# Patient Record
Sex: MALE | Race: WHITE | NOT HISPANIC OR LATINO | Employment: OTHER | URBAN - METROPOLITAN AREA
[De-identification: names, ages, dates, MRNs, and addresses within clinical notes are randomized per-mention and may not be internally consistent; named-entity substitution may affect disease eponyms.]

---

## 2017-01-29 ENCOUNTER — HOSPITAL ENCOUNTER (EMERGENCY)
Facility: OTHER | Age: 77
Discharge: HOME | End: 2017-01-29
Attending: EMERGENCY MEDICINE
Payer: MEDICARE

## 2017-01-29 VITALS
RESPIRATION RATE: 18 BRPM | SYSTOLIC BLOOD PRESSURE: 131 MMHG | TEMPERATURE: 98 F | DIASTOLIC BLOOD PRESSURE: 56 MMHG | OXYGEN SATURATION: 97 % | WEIGHT: 148 LBS | BODY MASS INDEX: 22.5 KG/M2 | HEART RATE: 61 BPM

## 2017-01-29 DIAGNOSIS — I10 ASYMPTOMATIC HYPERTENSION: Primary | ICD-10-CM

## 2017-01-29 DIAGNOSIS — I15.9 SECONDARY HYPERTENSION: ICD-10-CM

## 2017-01-29 LAB
POC B-TYPE NATRIURETIC PEPTIDE: 307 PG/ML (ref 0–100)
POC CARDIAC TROPONIN I: 0 NG/ML
SAMPLE: NORMAL

## 2017-01-29 PROCEDURE — 83880 ASSAY OF NATRIURETIC PEPTIDE: CPT

## 2017-01-29 PROCEDURE — 84484 ASSAY OF TROPONIN QUANT: CPT

## 2017-01-29 PROCEDURE — 80053 COMPREHEN METABOLIC PANEL: CPT

## 2017-01-29 PROCEDURE — 99284 EMERGENCY DEPT VISIT MOD MDM: CPT | Mod: 25

## 2017-01-29 NOTE — DISCHARGE INSTRUCTIONS
There was no evidence of any end organ damage from your high blood pressure during your workup in the emergency department today.  However if your blood pressure is actually as high as some of your home readings, it is possible that he will need to work with your doctors, particularly Dr. Keller in changing your dosing, possibly even increasing your clonidine dosing.  Call Dr. Keller first thing Monday morning to discuss her ED visit and possible changes.        Step-by-Step  Checking Your Blood Pressure    © 9176-3308 [employer]. [address] [place] [address], [place] [address]. All rights reserved. This information is not intended as a substitute for professional medical care. Always follow your healthcare professional's instructions.          Taking Your Blood Pressure  Blood pressure is the force of blood against the artery wall as it moves from the heart through the blood vessels. You can take your own blood pressure reading using a digital monitor. Take readings as often as your healthcare provider instructs. Take each reading at the same time of day.  Step 1. Relax    · Take your blood pressure at the same time every day, such as in the morning or evening, or at the time your healthcare provider recommends.  · Wait at least a half-hour after smoking, eating, drinking caffeinated beverages, or exercising.  · Sit comfortably at a table with both feet on the floor. Do not cross your legs or feet. Place the monitor near you.  · Rest for a few minutes before you begin.  Step 2. Wrap the cuff    · Place your arm on the table, palm up. Your arm should be at the level of your heart. Wrap the cuff around your upper arm, just above your elbow. Its best done on bare skin, not over clothing. Most cuffs will indicate where the brachial artery (the blood vessel in the middle of the arm at the inner side of the elbow) should line up with the cuff. Look in your monitor's instruction booklet for an illustration. You  can also bring your cuff to your healthcare provider and have them show you how to correctly place the cuff.  · Make sure your cuff fits. If it doesnt wrap around your upper arm, order a larger cuff.  Step 3. Inflate the cuff    · Pump the cuff until the scale reads 160. If you have a self-inflating cuff, push the button that starts the pump.  · The cuff will tighten, then loosen.  · The numbers will change. When they stop changing, your blood pressure reading will appear.  · Take 2 or 3 readings one minute apart.  Step 4. Write down the results of each reading    · Write down your blood pressure numbers for each reading. Note the date and time. Keep your results in one place, such as a notebook. Even if your monitor has a built-in memory, keep a hard copy of the readings.  · Remove the cuff from your arm. Turn off the machine.  · Share your blood pressure records with your healthcare providers at each visit.  © 3161-3983 [employer]. [address] [place] [address], [place] [address]. All rights reserved. This information is not intended as a substitute for professional medical care. Always follow your healthcare professional's instructions.          Discharge Instructions: Taking Your Blood Pressure  Blood pressure is the force of blood as it moves from the heart through the blood vessels. You can take your own blood pressure reading using a digital monitor. Take readings as often as your healthcare provider instructs. Take your readings each time in the same way, using the same arm. Here are guidelines for taking your blood pressure.  The American Heart Association (AHA) recommends purchasing a blood pressure monitor that is validated and approved by the Association for the Advancement of Medical Instrumentation, the [place] Hypertension Society, and the International Protocol for the Validation of Automated BP Measuring Devices. If the blood pressure monitor is for a senior adult, a pregnant woman, or a  child, make certain it is validated for use with such a population. For the most reliable readings, the AHA recommends an automatic, cuff-style, upper arm (bicep) monitor. The readings from finger and wrist monitors are not as reliable as the upper arm monitor.        Step 1. Relax    · Wait at least a half hour after smoking, eating, or exercising. Do not drink coffee, tea, soda, or other caffeinated beverages before checking your blood pressure.   · Sit comfortably at a table. Place the monitor near you.  · Rest for a few minutes before you begin.        Step 2. Wrap the cuff    · Place your arm on the table, palm up. Put your arm in a position that is level with your heart. Wrap the cuff around your upper arm, about an inch above your elbow. Its best to wrap the cuff on bare skin, not over clothing.  · Make sure your cuff fits. If it doesnt wrap around your upper arm, order a larger cuff. A cuff that is too large or too small can result in an inaccurate blood pressure reading.           Step 3. Inflate the cuff    · Pump the cuff until the scale reads 200. If you have a self-inflating cuff, push the button that starts the pump.  · The cuff will tighten, then loosen.  · The numbers will change. When they stop changing, your blood pressure reading will appear.  · If you get a reading that is too high or too low for you, relax for a few minutes. Then do the test again.    Step 4. Write down the results  · Write down your blood pressure numbers. Dony the date and time. Keep your results in one place, such as a notebook.  · Remove the cuff from your arm. Turn off the machine.  · Take the readings with you to your medical appointments.  · If you start a new blood pressure medicine, or change a blood pressure medicine dose, note the day you started the new drug or dosage on your blood pressure recording sheet. This will help your healthcare provider monitor the effect of medication changes.     © 7363-7767 The  [employer]. [address] [place] [address], [place] [address]. All rights reserved. This information is not intended as a substitute for professional medical care. Always follow your healthcare professional's instructions.

## 2017-01-29 NOTE — ED AVS SNAPSHOT
[facility] EMERGENCY DEPARTMENT  [address] [facility] [address]               Raymond Garcia   2017 12:16 PM   ED    Description:  Male : 1940   Department:  [facility] Emergency Department           Your Care was Coordinated By:     Provider Role From To    Gabriel Stanley MD Attending Provider 17 1223 --      Reason for Visit     Hypertension           Diagnoses this Visit        Comments    Asymptomatic hypertension    -  Primary     Secondary hypertension           ED Disposition     ED Disposition Condition Comment    Discharge             To Do List           Follow-up Information     Schedule an appointment as soon as possible for a visit with Jesse Hogan MD.    Specialties:  Internal Medicine, Oncology, Hematology and Oncology    Why:  to follow up ED visit    Contact information:    [address]  [address]  [facility] [address]  [phone]          Call Sergio Keller MD.    Specialty:  Cardiology    Why:  to follow up ED visit and discuss increasing clonidine dosing.    Contact information:    [address]  [address]  hospitals CARDIOLOGY  [place] [id number]  [phone]          Follow up with [facility] Emergency Department.    Specialty:  Emergency Medicine    Why:  As needed, If symptoms worsen or you develop chest pain, shortness of breath or confusion.    Contact information:    [address] [facility] [address]  [phone]      [facility] On [facility]     [facility] On Call Nurse Care Line - 24/7 Assistance  Registered nurses in the [facility] On Call Center provide clinical advisement, health education, appointment booking, and other advisory services.  Call for this free service at [phone].             Medications           Message regarding Medications     Verify the changes and/or additions to your medication regime listed below are the same as discussed with your clinician today.  If any of these changes or additions are incorrect,  please notify your healthcare provider.             Verify that the below list of medications is an accurate representation of the medications you are currently taking.  If none reported, the list may be blank. If incorrect, please contact your healthcare provider. Carry this list with you in case of emergency.           Current Medications     acetaminophen-codeine 300-30mg (TYLENOL #3) 300-30 mg Tab Take by mouth every 6 (six) hours as needed.    aspirin 81 MG Chew Take 81 mg by mouth once daily.    atorvastatin (LIPITOR) 40 MG tablet Take 1 tablet (40 mg total) by mouth once daily.    clobetasol (CLOBEX) 0.05 % shampoo     clobetasol (TEMOVATE) 0.05 % cream Apply topically continuous prn.    desonide (DESOWEN) 0.05 % cream Apply topically 2 (two) times daily.    diltiazem (CARDIZEM) 30 MG tablet Take 1 tablet (30 mg total) by mouth every 6 (six) hours.    fenofibrate 160 MG Tab Take 1 tablet (160 mg total) by mouth once daily.    fluocinonide-emollient 0.05 % Crea Apply topically 2 (two) times daily.    fluticasone (VERAMYST) 27.5 mcg/actuation nasal spray 1 spray by Nasal route once daily.    isosorbide mononitrate (IMDUR) 30 MG 24 hr tablet Take 1 tablet (30 mg total) by mouth once daily.    lactulose (CHRONULAC) 10 gram/15 mL solution     lisinopril (PRINIVIL,ZESTRIL) 20 MG tablet TAKE ONE TABLET BY MOUTH EVERY DAY    metformin (GLUCOPHAGE) 500 MG tablet Take 1 tablet (500 mg total) by mouth once daily.    olanzapine (ZYPREXA) 15 MG Tab Take 5 mg by mouth nightly.           Clinical Reference Information           Your Vitals Were     BP Pulse Temp Resp Weight SpO2    131/56 61 98.1 °F (36.7 °C) 18 67.1 kg (148 lb) 97%    BMI                22.5 kg/m2          Allergies as of 1/29/2017        Reactions    Antihistamines - Alkylamine Shortness Of Breath    Gabapentin Shortness Of Breath    Ultram [Tramadol] Shortness Of Breath, Palpitations, Other (See Comments)    Irritability    Anusol-hc [Hydrocortisone]  Itching    Hyoscyamine Other (See Comments)    Depressed feeling    Butalbital-acetaminophen Palpitations    Zocor [Simvastatin] Nausea Only      Immunizations Administered on Date of Encounter - 1/29/2017     None      ED Micro, Lab, POCT     Start Ordered       Status Ordering Provider    01/29/17 1318 01/29/17 1318  POCT B-type natriuretic peptide (BNP)  Once      Final result     01/29/17 1303 01/29/17 1303  Troponin ISTAT  Once      Final result     01/29/17 1234 01/29/17 1233  POCT Troponin  Once      Completed     01/29/17 1234 01/29/17 1233  POCT B-type natriuretic peptide (BNP)  Once      Completed     01/29/17 1234 01/29/17 1233  POCT CMP  Once      Completed       ED Imaging Orders     None        Discharge Instructions       There was no evidence of any end organ damage from your high blood pressure during your workup in the emergency department today.  However if your blood pressure is actually as high as some of your home readings, it is possible that he will need to work with your doctors, particularly Dr. Keller in changing your dosing, possibly even increasing your clonidine dosing.  Call Dr. Keller first thing Monday morning to discuss her ED visit and possible changes.        Step-by-Step  Checking Your Blood Pressure    © 2732-4121 [employer]. [address] [place] [address], [place] [address]. All rights reserved. This information is not intended as a substitute for professional medical care. Always follow your healthcare professional's instructions.          Taking Your Blood Pressure  Blood pressure is the force of blood against the artery wall as it moves from the heart through the blood vessels. You can take your own blood pressure reading using a digital monitor. Take readings as often as your healthcare provider instructs. Take each reading at the same time of day.  Step 1. Relax    · Take your blood pressure at the same time every day, such as in the morning or evening, or at the time  your healthcare provider recommends.  · Wait at least a half-hour after smoking, eating, drinking caffeinated beverages, or exercising.  · Sit comfortably at a table with both feet on the floor. Do not cross your legs or feet. Place the monitor near you.  · Rest for a few minutes before you begin.  Step 2. Wrap the cuff    · Place your arm on the table, palm up. Your arm should be at the level of your heart. Wrap the cuff around your upper arm, just above your elbow. Its best done on bare skin, not over clothing. Most cuffs will indicate where the brachial artery (the blood vessel in the middle of the arm at the inner side of the elbow) should line up with the cuff. Look in your monitor's instruction booklet for an illustration. You can also bring your cuff to your healthcare provider and have them show you how to correctly place the cuff.  · Make sure your cuff fits. If it doesnt wrap around your upper arm, order a larger cuff.  Step 3. Inflate the cuff    · Pump the cuff until the scale reads 160. If you have a self-inflating cuff, push the button that starts the pump.  · The cuff will tighten, then loosen.  · The numbers will change. When they stop changing, your blood pressure reading will appear.  · Take 2 or 3 readings one minute apart.  Step 4. Write down the results of each reading    · Write down your blood pressure numbers for each reading. Note the date and time. Keep your results in one place, such as a notebook. Even if your monitor has a built-in memory, keep a hard copy of the readings.  · Remove the cuff from your arm. Turn off the machine.  · Share your blood pressure records with your healthcare providers at each visit.  © 0053-2942 [employer]. [address] [place] [address], [place] [address]. All rights reserved. This information is not intended as a substitute for professional medical care. Always follow your healthcare professional's instructions.          Discharge Instructions:  Taking Your Blood Pressure  Blood pressure is the force of blood as it moves from the heart through the blood vessels. You can take your own blood pressure reading using a digital monitor. Take readings as often as your healthcare provider instructs. Take your readings each time in the same way, using the same arm. Here are guidelines for taking your blood pressure.  The American Heart Association (AHA) recommends purchasing a blood pressure monitor that is validated and approved by the Association for the Advancement of Medical Instrumentation, the [place] Hypertension Society, and the International Protocol for the Validation of Automated BP Measuring Devices. If the blood pressure monitor is for a senior adult, a pregnant woman, or a child, make certain it is validated for use with such a population. For the most reliable readings, the AHA recommends an automatic, cuff-style, upper arm (bicep) monitor. The readings from finger and wrist monitors are not as reliable as the upper arm monitor.        Step 1. Relax    · Wait at least a half hour after smoking, eating, or exercising. Do not drink coffee, tea, soda, or other caffeinated beverages before checking your blood pressure.   · Sit comfortably at a table. Place the monitor near you.  · Rest for a few minutes before you begin.        Step 2. Wrap the cuff    · Place your arm on the table, palm up. Put your arm in a position that is level with your heart. Wrap the cuff around your upper arm, about an inch above your elbow. Its best to wrap the cuff on bare skin, not over clothing.  · Make sure your cuff fits. If it doesnt wrap around your upper arm, order a larger cuff. A cuff that is too large or too small can result in an inaccurate blood pressure reading.           Step 3. Inflate the cuff    · Pump the cuff until the scale reads 200. If you have a self-inflating cuff, push the button that starts the pump.  · The cuff will tighten, then loosen.  · The  numbers will change. When they stop changing, your blood pressure reading will appear.  · If you get a reading that is too high or too low for you, relax for a few minutes. Then do the test again.    Step 4. Write down the results  · Write down your blood pressure numbers. Dony the date and time. Keep your results in one place, such as a notebook.  · Remove the cuff from your arm. Turn off the machine.  · Take the readings with you to your medical appointments.  · If you start a new blood pressure medicine, or change a blood pressure medicine dose, note the day you started the new drug or dosage on your blood pressure recording sheet. This will help your healthcare provider monitor the effect of medication changes.     © 9252-6108 [employer]. [address] [place] [address], [place] [address]. All rights reserved. This information is not intended as a substitute for professional medical care. Always follow your healthcare professional's instructions.          Your Scheduled Appointments     Apr 05, 2017  9:20 AM CDT   Established Patient - Geriatric with Jesse Hogan MD   [facility] [place] ([facility])    [address] [place] [id number]   [phone]              Smoking Cessation     If you would like to quit smoking:   You may be eligible for free services if you are a [place] resident and started smoking cigarettes before September 1, 1988.  Call the Smoking Cessation Trust (SCT) toll free at (070) 896-4607 or (618) 105-0491.   Call 5-002-QUIT-NOW if you do not meet the above criteria.             [facility] Emergency Department complies with applicable Federal civil rights laws and does not discriminate on the basis of race, color, national origin, age, disability, or sex.        Language Assistance Services     ATTENTION: Language assistance services are available, free of charge. Please call [phone].      ATENCIÓN: Si christopherla brenna, tiene a brooks disposición servicios  gratuitos de asistencia lingüística. Isai callahan [phone].     MILIND Ý: N?u b?n nói Ti?ng Vi?t, có các d?ch v? h? tr? ngôn ng? mi?n phí suzanh cho b?n. G?i s? [phone].

## 2017-01-29 NOTE — ED PROVIDER NOTES
Encounter Date: 1/29/2017       History     Chief Complaint   Patient presents with    Hypertension     states his pressure was high while at home, pressure on 131/56     Review of patient's allergies indicates:   Allergen Reactions    Antihistamines - alkylamine Shortness Of Breath    Gabapentin Shortness Of Breath    Ultram [tramadol] Shortness Of Breath, Palpitations and Other (See Comments)     Irritability    Anusol-hc [hydrocortisone] Itching    Hyoscyamine Other (See Comments)     Depressed feeling    Butalbital-acetaminophen Palpitations    Zocor [simvastatin] Nausea Only     HPI Comments: Patient presenting with asymptomatic elevation of his blood pressure.  He reports that at home today prior to taking his morning medication his systolic blood pressure was 205.  He took his morning blood pressure and it breakfasts and recheck it was then 225.  He took 1 additional dose of clonidine and presented to the ED, where his systolic was found to be in the 130s.  At no point throughout this process to the patient have any chest pain, shortness of breath, confusion or visual changes.    Past medical history significant for a recent presentation for a CVA versus TIA versus hypertensive emergency in which she presented 3 months ago with neurological deficits and malignant hypertension, then received TPA after his blood pressure was controlled.  He reports being followed by Dr. Keller for cardiology and was recently started on clonidine several weeks ago.  He reports that his initial dose was once daily, but now he is on twice daily clonidine.  He denies missing any doses of his clonidine or any other antihypertensive.    The history is provided by the patient and medical records.     Past Medical History   Diagnosis Date    Anxiety     Arthritis     Bipolar depression     Cataract     Depression     Hypertension     Peripheral arterial disease     Prediabetes      No past medical history pertinent  negatives.  Past Surgical History   Procedure Laterality Date    Vascular surgery Left      Left leg    Back surgery       x 2    Shoulder surgery Left      Family History   Problem Relation Age of Onset    Heart failure Father      Social History   Substance Use Topics    Smoking status: Former Smoker     Packs/day: 2.00     Years: 50.00     Types: Cigarettes     Quit date: 1/25/2010    Smokeless tobacco: Never Used    Alcohol use No     Review of Systems   Constitutional: Negative for fever.   HENT: Negative for facial swelling.    Eyes: Negative for redness.   Respiratory: Negative for shortness of breath.    Cardiovascular: Negative for chest pain.   Gastrointestinal: Negative for vomiting.   Musculoskeletal: Negative for gait problem.   Skin: Negative for pallor.   Neurological: Negative for facial asymmetry.   Psychiatric/Behavioral: Negative for confusion.   All other systems reviewed and are negative.      Physical Exam   Initial Vitals   BP Pulse Resp Temp SpO2   01/29/17 1204 01/29/17 1204 01/29/17 1204 01/29/17 1204 01/29/17 1204   131/56 61 18 98.1 °F (36.7 °C) 97 %     Physical Exam    Nursing note and vitals reviewed.  Constitutional: He appears well-developed and well-nourished. He is not diaphoretic.   Well-appearing elderly white male with a systolic blood pressure in the 130s.   HENT:   Head: Normocephalic and atraumatic.   Eyes: EOM are normal.   Neck: Normal range of motion. Neck supple.   Cardiovascular: Normal rate and regular rhythm.   Pulmonary/Chest: Breath sounds normal. No respiratory distress. He has no rales.   Abdominal: Soft. He exhibits no distension. Mass: suprapubic, mid line circular mass. There is no rebound.   Musculoskeletal: Normal range of motion. He exhibits no edema.   Neurological: He is alert and oriented to person, place, and time.   Skin: Skin is warm and dry.   Psychiatric: He has a normal mood and affect.         ED Course   Procedures  Labs Reviewed - No data  to display          Medical Decision Making:   Initial Assessment:   Patient presenting with asymptomatic hypertension, resolved PTA  Differential Diagnosis:   Rebound hypertension secondary to clonidine dosing, malfunctioning home pressure machine, labile hypertension, resolved hypertensive emergency.  ED Management:  Although the patient's blood pressure is normal in the ED and he has had no symptoms of chest pain, shortness of breath or confusion.  His past medical history and were reported systolic blood pressure over 220 warrants further workup for end organ damage.  We'll obtain EKG, CMP, troponin, BNP to evaluate for any silent and organ damage.    The patient's disposition is pending remaining results of the ED workup.  Will continue to closely monitor the patient throughout their ED stay.    Gabriel Stanley MD,   Emergency Medicine  01/29/2017 1:00 PM      Update:    BNP resulted at 307, troponin is undetectable, creatinine is 0.8.  EKG findings as below.    No evidence of end organ damage.    In my review of standard clonidine dosing, it does not appear there is any indication for more than twice daily dosing, therefore rebound hypertension from clonidine is an unlikely etiology.  He may simply need higher dosing of clonidine and his other antihypertensive to appropriately control his blood pressure.  He will be discharged with instructions to follow up closely with his cardiologist and given strict return precautions.    Gabriel Stanley MD,   Emergency Medicine  01/29/2017 2:03 PM    (This note was written with voice recognition software.  Please excuse any grammatical errors.)   (This note was written with voice recognition software.  Please excuse any grammatical errors.)                     ED Course   Comment By Time   EKG shows no ST elevation, sinus bradycardia. Gabriel Stanley MD 01/29 7301     Clinical Impression:   The primary encounter diagnosis was Asymptomatic hypertension. A diagnosis of  Secondary hypertension was also pertinent to this visit.          Gabriel Stanley MD  01/29/17 6002

## 2017-02-23 ENCOUNTER — HOSPITAL ENCOUNTER (INPATIENT)
Facility: HOSPITAL | Age: 77
LOS: 1 days | Discharge: HOME | DRG: 305 | End: 2017-02-25
Attending: EMERGENCY MEDICINE | Admitting: INTERNAL MEDICINE
Payer: MEDICARE

## 2017-02-23 DIAGNOSIS — R00.1 SYMPTOMATIC BRADYCARDIA: ICD-10-CM

## 2017-02-23 DIAGNOSIS — R55 NEAR SYNCOPE: ICD-10-CM

## 2017-02-23 DIAGNOSIS — E86.0 DEHYDRATION: ICD-10-CM

## 2017-02-23 DIAGNOSIS — I16.0 HYPERTENSIVE URGENCY: ICD-10-CM

## 2017-02-23 DIAGNOSIS — I95.1 ORTHOSTATIC HYPOTENSION: ICD-10-CM

## 2017-02-23 DIAGNOSIS — R42 DIZZINESS: Primary | ICD-10-CM

## 2017-02-23 LAB
ALBUMIN SERPL BCP-MCNC: 4 G/DL
ALP SERPL-CCNC: 50 U/L
ALT SERPL W/O P-5'-P-CCNC: 10 U/L
ANION GAP SERPL CALC-SCNC: 10 MMOL/L
APTT BLDCRRT: 28.2 SEC
AST SERPL-CCNC: 16 U/L
BACTERIA #/AREA URNS HPF: NORMAL /HPF
BASOPHILS # BLD AUTO: 0.06 K/UL
BASOPHILS NFR BLD: 0.8 %
BILIRUB SERPL-MCNC: 0.7 MG/DL
BILIRUB UR QL STRIP: NEGATIVE
BNP SERPL-MCNC: 140 PG/ML
BUN SERPL-MCNC: 11 MG/DL
CALCIUM SERPL-MCNC: 9.8 MG/DL
CHLORIDE SERPL-SCNC: 96 MMOL/L
CLARITY UR: CLEAR
CO2 SERPL-SCNC: 30 MMOL/L
COLOR UR: ABNORMAL
CREAT SERPL-MCNC: 1 MG/DL
DIFFERENTIAL METHOD: ABNORMAL
EOSINOPHIL # BLD AUTO: 0.2 K/UL
EOSINOPHIL NFR BLD: 3.2 %
ERYTHROCYTE [DISTWIDTH] IN BLOOD BY AUTOMATED COUNT: 14 %
EST. GFR  (AFRICAN AMERICAN): >60 ML/MIN/1.73 M^2
EST. GFR  (NON AFRICAN AMERICAN): >60 ML/MIN/1.73 M^2
FLUAV AG SPEC QL IA: NEGATIVE
FLUBV AG SPEC QL IA: NEGATIVE
GIANT PLATELETS BLD QL SMEAR: PRESENT
GLUCOSE SERPL-MCNC: 106 MG/DL
GLUCOSE UR QL STRIP: NEGATIVE
HCT VFR BLD AUTO: 42.7 %
HGB BLD-MCNC: 13.7 G/DL
HGB UR QL STRIP: NEGATIVE
HYALINE CASTS #/AREA URNS LPF: 0 /LPF
INR PPP: 1
KETONES UR QL STRIP: NEGATIVE
LEUKOCYTE ESTERASE UR QL STRIP: NEGATIVE
LYMPHOCYTES # BLD AUTO: 1.2 K/UL
LYMPHOCYTES NFR BLD: 15.9 %
MAGNESIUM SERPL-MCNC: 1.9 MG/DL
MCH RBC QN AUTO: 29.4 PG
MCHC RBC AUTO-ENTMCNC: 32.1 %
MCV RBC AUTO: 92 FL
MICROSCOPIC COMMENT: NORMAL
MONOCYTES # BLD AUTO: 0.7 K/UL
MONOCYTES NFR BLD: 9.6 %
NEUTROPHILS # BLD AUTO: 5.4 K/UL
NEUTROPHILS NFR BLD: 70.6 %
NITRITE UR QL STRIP: NEGATIVE
PH UR STRIP: 7 [PH] (ref 5–8)
PLATELET # BLD AUTO: 225 K/UL
PLATELET BLD QL SMEAR: ABNORMAL
PMV BLD AUTO: 11.7 FL
POCT GLUCOSE: 104 MG/DL (ref 70–110)
POCT GLUCOSE: 78 MG/DL (ref 70–110)
POTASSIUM SERPL-SCNC: 4.3 MMOL/L
PROT SERPL-MCNC: 7.6 G/DL
PROT UR QL STRIP: ABNORMAL
PROTHROMBIN TIME: 10.7 SEC
RBC # BLD AUTO: 4.66 M/UL
RBC #/AREA URNS HPF: 0 /HPF (ref 0–4)
SODIUM SERPL-SCNC: 136 MMOL/L
SP GR UR STRIP: 1.01 (ref 1–1.03)
SPECIMEN SOURCE: NORMAL
TROPONIN I SERPL DL<=0.01 NG/ML-MCNC: 0.01 NG/ML
TROPONIN I SERPL DL<=0.01 NG/ML-MCNC: 0.04 NG/ML
TROPONIN I SERPL DL<=0.01 NG/ML-MCNC: <0.006 NG/ML
TSH SERPL DL<=0.005 MIU/L-ACNC: 1.24 UIU/ML
URN SPEC COLLECT METH UR: ABNORMAL
UROBILINOGEN UR STRIP-ACNC: NEGATIVE EU/DL
WBC # BLD AUTO: 7.59 K/UL
WBC #/AREA URNS HPF: 2 /HPF (ref 0–5)

## 2017-02-23 PROCEDURE — 96375 TX/PRO/DX INJ NEW DRUG ADDON: CPT

## 2017-02-23 PROCEDURE — 25000003 PHARM REV CODE 250: Performed by: EMERGENCY MEDICINE

## 2017-02-23 PROCEDURE — 63600175 PHARM REV CODE 636 W HCPCS: Performed by: EMERGENCY MEDICINE

## 2017-02-23 PROCEDURE — C9113 INJ PANTOPRAZOLE SODIUM, VIA: HCPCS | Performed by: EMERGENCY MEDICINE

## 2017-02-23 PROCEDURE — 36415 COLL VENOUS BLD VENIPUNCTURE: CPT

## 2017-02-23 PROCEDURE — 85610 PROTHROMBIN TIME: CPT

## 2017-02-23 PROCEDURE — 96374 THER/PROPH/DIAG INJ IV PUSH: CPT

## 2017-02-23 PROCEDURE — G0378 HOSPITAL OBSERVATION PER HR: HCPCS

## 2017-02-23 PROCEDURE — 99285 EMERGENCY DEPT VISIT HI MDM: CPT | Mod: 25

## 2017-02-23 PROCEDURE — 80053 COMPREHEN METABOLIC PANEL: CPT

## 2017-02-23 PROCEDURE — 96361 HYDRATE IV INFUSION ADD-ON: CPT

## 2017-02-23 PROCEDURE — 84443 ASSAY THYROID STIM HORMONE: CPT

## 2017-02-23 PROCEDURE — 93005 ELECTROCARDIOGRAM TRACING: CPT

## 2017-02-23 PROCEDURE — 81000 URINALYSIS NONAUTO W/SCOPE: CPT

## 2017-02-23 PROCEDURE — 83735 ASSAY OF MAGNESIUM: CPT

## 2017-02-23 PROCEDURE — 83880 ASSAY OF NATRIURETIC PEPTIDE: CPT

## 2017-02-23 PROCEDURE — 96376 TX/PRO/DX INJ SAME DRUG ADON: CPT

## 2017-02-23 PROCEDURE — 85730 THROMBOPLASTIN TIME PARTIAL: CPT

## 2017-02-23 PROCEDURE — 84484 ASSAY OF TROPONIN QUANT: CPT | Mod: 91

## 2017-02-23 PROCEDURE — 82962 GLUCOSE BLOOD TEST: CPT

## 2017-02-23 PROCEDURE — 63700000 PHARM REV CODE 250 ALT 637 W/O HCPCS: Performed by: EMERGENCY MEDICINE

## 2017-02-23 PROCEDURE — 87400 INFLUENZA A/B EACH AG IA: CPT

## 2017-02-23 PROCEDURE — 85025 COMPLETE CBC W/AUTO DIFF WBC: CPT

## 2017-02-23 RX ORDER — BISACODYL 10 MG
10 SUPPOSITORY, RECTAL RECTAL DAILY PRN
Status: DISCONTINUED | OUTPATIENT
Start: 2017-02-23 | End: 2017-02-25 | Stop reason: HOSPADM

## 2017-02-23 RX ORDER — NAPROXEN SODIUM 220 MG/1
81 TABLET, FILM COATED ORAL DAILY
Status: DISCONTINUED | OUTPATIENT
Start: 2017-02-23 | End: 2017-02-25 | Stop reason: HOSPADM

## 2017-02-23 RX ORDER — ATORVASTATIN CALCIUM 40 MG/1
40 TABLET, FILM COATED ORAL DAILY
Status: DISCONTINUED | OUTPATIENT
Start: 2017-02-23 | End: 2017-02-25 | Stop reason: HOSPADM

## 2017-02-23 RX ORDER — FENOFIBRATE 160 MG/1
160 TABLET ORAL DAILY
Status: DISCONTINUED | OUTPATIENT
Start: 2017-02-23 | End: 2017-02-25 | Stop reason: HOSPADM

## 2017-02-23 RX ORDER — CLONIDINE HYDROCHLORIDE 0.1 MG/1
0.1 TABLET ORAL 2 TIMES DAILY
Status: ON HOLD | COMMUNITY
End: 2017-03-16 | Stop reason: HOSPADM

## 2017-02-23 RX ORDER — HYDRALAZINE HYDROCHLORIDE 20 MG/ML
10 INJECTION INTRAMUSCULAR; INTRAVENOUS
Status: COMPLETED | OUTPATIENT
Start: 2017-02-23 | End: 2017-02-23

## 2017-02-23 RX ORDER — SODIUM CHLORIDE 9 MG/ML
1000 INJECTION, SOLUTION INTRAVENOUS ONCE
Status: DISCONTINUED | OUTPATIENT
Start: 2017-02-23 | End: 2017-02-23

## 2017-02-23 RX ORDER — MECLIZINE HYDROCHLORIDE 25 MG/1
25 TABLET ORAL
Status: COMPLETED | OUTPATIENT
Start: 2017-02-23 | End: 2017-02-23

## 2017-02-23 RX ORDER — FLUTICASONE PROPIONATE 50 MCG
1 SPRAY, SUSPENSION (ML) NASAL DAILY
Status: DISCONTINUED | OUTPATIENT
Start: 2017-02-23 | End: 2017-02-25 | Stop reason: HOSPADM

## 2017-02-23 RX ORDER — ONDANSETRON 2 MG/ML
4 INJECTION INTRAMUSCULAR; INTRAVENOUS EVERY 12 HOURS PRN
Status: DISCONTINUED | OUTPATIENT
Start: 2017-02-23 | End: 2017-02-25 | Stop reason: HOSPADM

## 2017-02-23 RX ORDER — PANTOPRAZOLE SODIUM 40 MG/10ML
40 INJECTION, POWDER, LYOPHILIZED, FOR SOLUTION INTRAVENOUS DAILY
Status: DISCONTINUED | OUTPATIENT
Start: 2017-02-23 | End: 2017-02-25 | Stop reason: HOSPADM

## 2017-02-23 RX ORDER — CLONIDINE HYDROCHLORIDE 0.1 MG/1
0.1 TABLET ORAL 2 TIMES DAILY
Status: DISCONTINUED | OUTPATIENT
Start: 2017-02-23 | End: 2017-02-24

## 2017-02-23 RX ORDER — INSULIN ASPART 100 [IU]/ML
0-5 INJECTION, SOLUTION INTRAVENOUS; SUBCUTANEOUS EVERY 6 HOURS PRN
Status: DISCONTINUED | OUTPATIENT
Start: 2017-02-23 | End: 2017-02-25 | Stop reason: HOSPADM

## 2017-02-23 RX ORDER — ISOSORBIDE MONONITRATE 30 MG/1
30 TABLET, EXTENDED RELEASE ORAL DAILY
Status: DISCONTINUED | OUTPATIENT
Start: 2017-02-23 | End: 2017-02-25 | Stop reason: HOSPADM

## 2017-02-23 RX ORDER — ACETAMINOPHEN AND CODEINE PHOSPHATE 300; 30 MG/1; MG/1
1 TABLET ORAL EVERY 6 HOURS PRN
Status: DISCONTINUED | OUTPATIENT
Start: 2017-02-23 | End: 2017-02-25 | Stop reason: HOSPADM

## 2017-02-23 RX ORDER — OLANZAPINE 2.5 MG/1
5 TABLET ORAL NIGHTLY
Status: DISCONTINUED | OUTPATIENT
Start: 2017-02-23 | End: 2017-02-25 | Stop reason: HOSPADM

## 2017-02-23 RX ORDER — GLUCAGON 1 MG
1 KIT INJECTION
Status: DISCONTINUED | OUTPATIENT
Start: 2017-02-23 | End: 2017-02-25 | Stop reason: HOSPADM

## 2017-02-23 RX ORDER — LISINOPRIL 20 MG/1
20 TABLET ORAL DAILY
Status: DISCONTINUED | OUTPATIENT
Start: 2017-02-23 | End: 2017-02-25 | Stop reason: HOSPADM

## 2017-02-23 RX ADMIN — PANTOPRAZOLE SODIUM 40 MG: 40 INJECTION, POWDER, FOR SOLUTION INTRAVENOUS at 02:02

## 2017-02-23 RX ADMIN — SODIUM CHLORIDE 1000 ML: 0.9 INJECTION, SOLUTION INTRAVENOUS at 07:02

## 2017-02-23 RX ADMIN — OLANZAPINE 5 MG: 2.5 TABLET, FILM COATED ORAL at 10:02

## 2017-02-23 RX ADMIN — ATORVASTATIN CALCIUM 40 MG: 40 TABLET, FILM COATED ORAL at 02:02

## 2017-02-23 RX ADMIN — ASPIRIN 81 MG CHEWABLE TABLET 81 MG: 81 TABLET CHEWABLE at 02:02

## 2017-02-23 RX ADMIN — HYDRALAZINE HYDROCHLORIDE 10 MG: 20 INJECTION INTRAMUSCULAR; INTRAVENOUS at 12:02

## 2017-02-23 RX ADMIN — MECLIZINE HYDROCHLORIDE 25 MG: 25 TABLET ORAL at 07:02

## 2017-02-23 RX ADMIN — CLONIDINE HYDROCHLORIDE 0.1 MG: 0.1 TABLET ORAL at 09:02

## 2017-02-23 RX ADMIN — ISOSORBIDE MONONITRATE 30 MG: 30 TABLET, EXTENDED RELEASE ORAL at 02:02

## 2017-02-23 RX ADMIN — HYDRALAZINE HYDROCHLORIDE 10 MG: 20 INJECTION INTRAMUSCULAR; INTRAVENOUS at 11:02

## 2017-02-23 RX ADMIN — LISINOPRIL 20 MG: 20 TABLET ORAL at 02:02

## 2017-02-23 RX ADMIN — FENOFIBRATE 160 MG: 160 TABLET ORAL at 02:02

## 2017-02-23 NOTE — CONSULTS
76 yom, hx of severe uncontrolled HTN, hx of CVA few months ago treated with tpa at [facility]. No coronary or cardiac hx. Nl EF 10/16. Hx of PAD left iliac stent. Prior smoker.    He has chronic severe HTN on clinic measurements. He takes lisinpril, diltiazem 30 qid (unclear why not long acting),clonidine 0.1 tid and Imdur (although no CAD?). This Monday, his clonidine was raised to 0.2 mg TID.  Since then, and especially over the last day, he has been feeling severely dizzy upon orthostasis and upon walking, gee overnight as he was trying to go to the restroom. No syncope, no significant CP. No gross focal weakness or neuro deficit.  Severely hypertensive in ED, with a significant orthostatic drop. --->100, with no significant rise in pulse  Sinus bradycardia 49 upon admission, quickly charly to 80s over the last few hours. Has taken clonidine 0.2 mg this AM.    Review of Systems   Constitutional: Negative for appetite change, chills and fever.   HENT: Negative for congestion, ear pain, rhinorrhea and sore throat.   Eyes: Negative for pain.   Respiratory: Negative for cough and shortness of breath.   Cardiovascular: Negative for chest pain, palpitations and leg swelling.   Gastrointestinal: Negative for abdominal pain, constipation, diarrhea, nausea and vomiting.   Genitourinary: Negative for difficulty urinating, dysuria, frequency, hematuria and urgency.   Musculoskeletal: Negative for back pain and neck pain.   Skin: Negative for rash.     On exam:  Vitals as above  Head: Normocephalic and atraumatic.   Right Ear: External ear normal.   Left Ear: External ear normal.   Eyes: EOM are normal. Pupils are equal, round, and reactive to light.   Neck: Trachea normal. Neck supple.   Cardiovascular: Normal rate, regular rhythm and normal heart sounds. Exam reveals no gallop and no friction rub.   No murmur heard.  Pulmonary/Chest: Breath sounds normal. No respiratory distress. He has no wheezes. He has no  rhonchi. He has no rales.   Abdominal: Soft. Normal appearance and bowel sounds are normal. He exhibits no distension. There is no tenderness.   Musculoskeletal: Normal range of motion. He exhibits no edema.   Neurological: He is alert and oriented to person, place, and time. He has normal strength. No cranial nerve deficit or sensory deficit.   Patient has no evidence of facial droop. Negative pronator drift. Normal rapid alternating movements of the hand. Normal finger to nose.   Skin: Skin is warm, dry and intact. No rash noted.   Psychiatric: He has a normal mood and affect. His speech is not slurred    ECG earlier: sinus jocelynn 49 with RBBB    Impression:  Severe supine HTN with severe orthostatic drop, along with sinus bradycardia (resolved now). The orthostatic drop and relative initial bradycardia are related to the change in clonidine dose.  His anti-HTN regimen needs to be changed:    -Clonidine needs to be reduced to 0.1 mg TID. Cannot stop because of the risk of further rebound HTN  -Keep lisinopril  -Keep diltiazem for now. Start norvasc 10 mg or nifedipine XL 90 mg and eventually stop diltiazem before discharge.  -May need a small HCTZ dose 12.5 if remains hypertensive tomorrow.

## 2017-02-23 NOTE — ED PROVIDER NOTES
"Encounter Date: 2/23/2017    SCRIBE #1 NOTE: I, Lily De Dios, am scribing for, and in the presence of,  Mragy Sheffield MD. I have scribed the following portions of the note - Other sections scribed: HPI, ROS, and Physical Exam.       History     Chief Complaint   Patient presents with    Dizziness     started when he woke up this morning about 1 hour PTA; Pt states that he woke up a few times throughout the night and "just wasn't feeling well"     Review of patient's allergies indicates:   Allergen Reactions    Antihistamines - alkylamine Shortness Of Breath    Gabapentin Shortness Of Breath    Ultram [tramadol] Shortness Of Breath, Palpitations and Other (See Comments)     Irritability    Anusol-hc [hydrocortisone] Itching    Hyoscyamine Other (See Comments)     Depressed feeling    Butalbital-acetaminophen Palpitations    Zocor [simvastatin] Nausea Only     HPI Comments: CC: Dizziness    HPI: This 76 y.o. Male who has anxiety, arthritis, HTN, Depression, prediabetes, peripheral arterial disease, and CVA presents to the emergency department complaining of acute onset, intermittent episodes of dizziness that began approximately 1 hour prior to arrival.  Patient reports he stood to walk to the bathroom this morning, when he began feeling dizzy.  Patient also reports of associated paresthesias to his bilateral upper extremities.  Patient reports feeling as though he would pass out when his symptoms occurred.  Patient reports having several episodes of these symptoms throughout the night while attempting to use the bathroom.  Patient reports of previous episodes of dizziness, which he notices occurs when standing from a seated or lying position.  Patient states he has to compose himself once standing prior to taking his initial steps.  Patient reports his blood pressure has been fluctuating throughout the week and reports on Monday, he had an initial  that morning then reports it decreasing to "  with a heart rate of 118 approximately 3 hours later.   Patient reports having lower heart rates ranging between 46-48 in the past, but states he did not pay attention to it.  Patient reports recently his Clonidine was increased from 0.1 mg to 0.2 mg.  Patient reports last taking his Clonidine this morning.  Patient denies fever, chills, nausea, emesis, diarrhea, abdominal pain, chest pain, back pain, neck pain, cough, rhinorrhea, dysuria, blood in stool, or any other associated symptoms.  No prior treatment.  No alleviating factors.  Patient reports his PCP as Dr. Hogan and his Cardiologist as Dr. Keller.    The history is provided by the patient. No  was used.     Past Medical History   Diagnosis Date    Anxiety     Arthritis     Bipolar depression     Cataract     Depression     Hypertension     Peripheral arterial disease     Prediabetes     Stroke      No past medical history pertinent negatives.  Past Surgical History   Procedure Laterality Date    Vascular surgery Left      Left leg    Back surgery       x 2    Shoulder surgery Left      Family History   Problem Relation Age of Onset    Heart failure Father      Social History   Substance Use Topics    Smoking status: Former Smoker     Packs/day: 2.00     Years: 50.00     Types: Cigarettes     Quit date: 1/25/2010    Smokeless tobacco: Never Used    Alcohol use No     Review of Systems   Constitutional: Negative for appetite change, chills and fever.   HENT: Negative for congestion, ear pain, rhinorrhea and sore throat.    Eyes: Negative for pain.   Respiratory: Negative for cough and shortness of breath.    Cardiovascular: Negative for chest pain, palpitations and leg swelling.   Gastrointestinal: Negative for abdominal pain, constipation, diarrhea, nausea and vomiting.        (+) indigestion   Genitourinary: Negative for difficulty urinating, dysuria, frequency, hematuria and urgency.   Musculoskeletal:  Negative for back pain and neck pain.        (-) arm or leg problems   Skin: Negative for rash.   Neurological: Positive for dizziness. Negative for syncope, speech difficulty, weakness, numbness and headaches.        (+) bilateral arm paresthesias       Physical Exam   Initial Vitals   BP Pulse Resp Temp SpO2   02/23/17 0605 02/23/17 0605 02/23/17 0605 02/23/17 0605 02/23/17 0605   190/70 80 18 97.5 °F (36.4 °C) 97 %     Physical Exam    Nursing note and vitals reviewed.  Constitutional: Vital signs are normal. He appears well-developed and well-nourished. He is active.  Non-toxic appearance. No distress.   Pleasant, frail, elderly male in no acute distress   HENT:   Head: Normocephalic and atraumatic.   Right Ear: External ear normal.   Left Ear: External ear normal.   Bilateral TMs clear.   Eyes: EOM are normal. Pupils are equal, round, and reactive to light.   Neck: Trachea normal. Neck supple.   Cardiovascular: Regular rhythm and normal heart sounds. Bradycardia present.  Exam reveals no gallop and no friction rub.    No murmur heard.  Pulmonary/Chest: Breath sounds normal. No respiratory distress. He has no wheezes. He has no rhonchi. He has no rales.   Abdominal: Soft. Normal appearance and bowel sounds are normal. He exhibits no distension. There is no tenderness.   Musculoskeletal: Normal range of motion. He exhibits no edema.   Neurological: He is alert and oriented to person, place, and time. He has normal strength. No cranial nerve deficit or sensory deficit.   Patient has no evidence of facial droop.  No slurred speech.  Negative pronator drift.  Normal rapid alternating movements of the hands.  Normal finger to nose.   Skin: Skin is warm, dry and intact. No rash noted.   Psychiatric: He has a normal mood and affect. His speech is not slurred.         ED Course   Critical Care  Date/Time: 2/23/2017 4:00 PM  Performed by: LUCINDA GOODWIN  Authorized by: LUCINDA GOODWIN   Direct patient  critical care time: 25 minutes  Additional history critical care time: 5 (wife) minutes  Ordering / reviewing critical care time: 3 minutes  Documentation critical care time: 10 minutes  Consulting other physicians critical care time: 5 minutes  Total critical care time (exclusive of procedural time) : 48 minutes        Labs Reviewed   COMPREHENSIVE METABOLIC PANEL - Abnormal; Notable for the following:        Result Value    CO2 30 (*)     Alkaline Phosphatase 50 (*)     All other components within normal limits   CBC W/ AUTO DIFFERENTIAL - Abnormal; Notable for the following:     Hemoglobin 13.7 (*)     Lymph% 15.9 (*)     All other components within normal limits   URINALYSIS - Abnormal; Notable for the following:     Protein, UA 2+ (*)     All other components within normal limits   B-TYPE NATRIURETIC PEPTIDE - Abnormal; Notable for the following:      (*)     All other components within normal limits   INFLUENZA A AND B ANTIGEN   TSH   TROPONIN I   APTT   PROTIME-INR   MAGNESIUM   URINALYSIS MICROSCOPIC   POCT GLUCOSE   POCT GLUCOSE MONITORING CONTINUOUS     EKG Readings: (Independently Interpreted)   Sinus bradycardia, rate approximately 49.  No ST elevation or depression.  T-wave inversion noted in lead III and aVF.  QTc 422.  When compared to prior EKG, T-wave inversions appear new.          Medical Decision Making:   History:   Old Medical Records: I decided to obtain old medical records.  Old Records Summarized: records from clinic visits.  Independently Interpreted Test(s):   I have ordered and independently interpreted X-rays - see summary below.       <> Summary of X-Ray Reading(s): Chest x-ray independently interpreted by me as negative for acute infiltrates, pneumothorax, effusion, widening mediastinum, or other acute cardiopulmonary process.    76 y.o. male presents to the emergency department with acute onset dizziness, feeling as if he was going to pass out, and feeling off balance.  He  specifically denies chest pain and shortness of breath.  Denies nausea and vomiting.  Differential diagnosis includes but not limited to dehydration, arrhythmia, MI, PE, CHF, pneumonia, sepsis, among others.  Patient noted to be bradycardic.  EKG independently interpreted by me shows sinus bradycardia, rate 40s.  He has T-wave inversions present in lead III and aVF which appear to be new when compared to prior EKGs.  Labs ordered and reveal normal white blood cell count.  Hemoglobin 13.7, hematocrit 42.7.  Creatinine 1.0.  Potassium 4.3.  Troponin is less than 0.006.  .  Urinalysis is negative for infection.  Patient was orthostatic.  Given IV fluids.  Chest x-ray negative for acute cardiopulmonary process.  Head CT shows mild to moderate right inferior frontal and anterior temporal encephalomalacia unchanged, most compatible prior contusion.  Patient has superimposed age appropriate generalized cerebral volume loss and findings suggestive of chronic microvascular ischemic change.  No evidence of intracranial hemorrhage or new infarct.  Case discussed with his primary care doctor, Dr. Hogan.  Patient will be admitted for IV hydration, cardiology consult with symptomatic bradycardia, and cardiac monitoring.  Patient denies chest pain.  He states his dizziness has actually improved after IV fluids given in the emergency department.  Patient informed of admission and agrees.            Scribe Attestation:   Scribe #1: I performed the above scribed service and the documentation accurately describes the services I performed. I attest to the accuracy of the note.    Attending Attestation:           Physician Attestation for Scribe:  Physician Attestation Statement for Scribe #1: I, Margy Sheffield MD, reviewed documentation, as scribed by Lily De Dios in my presence, and it is both accurate and complete.                 ED Course     Clinical Impression:   The primary encounter diagnosis was Dizziness.  Diagnoses of Symptomatic bradycardia, Near syncope, Orthostatic hypotension, and Dehydration were also pertinent to this visit.          Margy Sheffield MD  02/23/17 5095       Margy Sheffield MD  02/23/17 7693

## 2017-02-23 NOTE — IP AVS SNAPSHOT
[place] [address] [address] [place] [address]  Phone: [phone]           Patient Discharge Instructions     Our goal is to set you up for success. This packet includes information on your condition, medications, and your home care. It will help you to care for yourself so you don't get sicker and need to go back to the hospital.     Please ask your nurse if you have any questions.        There are many details to remember when preparing to leave the hospital. Here is what you will need to do:    1. Take your medicine. If you are prescribed medications, review your Medication List in the following pages. You may have new medications to  at the pharmacy and others that you'll need to stop taking. Review the instructions for how and when to take your medications. Talk with your doctor or nurses if you are unsure of what to do.     2. Go to your follow-up appointments. Specific follow-up information is listed in the following pages. Your may be contacted by a transition nurse or clinical provider about future appointments. Be sure we have all of the phone numbers to reach you, if needed. Please contact your provider's office if you are unable to make an appointment.     3. Watch for warning signs. Your doctor or nurse will give you detailed warning signs to watch for and when to call for assistance. These instructions may also include educational information about your condition. If you experience any of warning signs to your health, call your doctor.               ** Verify the list of medication(s) below is accurate and up to date. Carry this with you in case of emergency. If your medications have changed, please notify your healthcare provider.             Medication List      START taking these medications        Additional Info                      amlodipine 10 MG tablet   Commonly known as:  NORVASC   Quantity:  30 tablet   Refills:  2   Dose:  10 mg    Last time this was  given:  10 mg on 2/25/2017  9:55 AM   Instructions:  Take 1 tablet (10 mg total) by mouth once daily.     Begin Date    AM    Noon    PM    Bedtime       pantoprazole 40 MG tablet   Commonly known as:  PROTONIX   Quantity:  30 tablet   Refills:  11   Dose:  40 mg    Instructions:  Take 1 tablet (40 mg total) by mouth once daily.     Begin Date    AM    Noon    PM    Bedtime         CHANGE how you take these medications        Additional Info                      clobetasol 0.05 % shampoo   Commonly known as:  CLOBEX   Refills:  0   What changed:  Another medication with the same name was removed. Continue taking this medication, and follow the directions you see here.      Begin Date    AM    Noon    PM    Bedtime         CONTINUE taking these medications        Additional Info                      acetaminophen-codeine 300-30mg 300-30 mg Tab   Commonly known as:  TYLENOL #3   Refills:  0    Last time this was given:  1 tablet on 2/25/2017  6:17 AM   Instructions:  Take by mouth every 6 (six) hours as needed.     Begin Date    AM    Noon    PM    Bedtime       aspirin 81 MG Chew   Refills:  0   Dose:  81 mg    Last time this was given:  81 mg on 2/25/2017  8:24 AM   Instructions:  Take 81 mg by mouth once daily.     Begin Date    AM    Noon    PM    Bedtime       atorvastatin 40 MG tablet   Commonly known as:  LIPITOR   Quantity:  90 tablet   Refills:  3   Dose:  40 mg    Last time this was given:  40 mg on 2/25/2017  8:24 AM   Instructions:  Take 1 tablet (40 mg total) by mouth once daily.     Begin Date    AM    Noon    PM    Bedtime       cloNIDine 0.1 MG tablet   Commonly known as:  CATAPRES   Refills:  0   Dose:  0.1 mg    Last time this was given:  0.1 mg on 2/25/2017  2:00 PM   Instructions:  Take 0.1 mg by mouth 2 (two) times daily.     Begin Date    AM    Noon    PM    Bedtime       desonide 0.05 % cream   Commonly known as:  DESOWEN   Refills:  0    Instructions:  Apply topically 2 (two) times daily.      Begin Date    AM    Noon    PM    Bedtime       fenofibrate 160 MG Tab   Quantity:  90 tablet   Refills:  1   Dose:  160 mg    Last time this was given:  160 mg on 2/25/2017  8:24 AM   Instructions:  Take 1 tablet (160 mg total) by mouth once daily.     Begin Date    AM    Noon    PM    Bedtime       fluocinonide-emollient 0.05 % Crea   Refills:  0    Instructions:  Apply topically 2 (two) times daily.     Begin Date    AM    Noon    PM    Bedtime       fluticasone 27.5 mcg/actuation nasal spray   Commonly known as:  VERAMYST   Quantity:  10 g   Refills:  2   Dose:  1 spray    Instructions:  1 spray by Nasal route once daily.     Begin Date    AM    Noon    PM    Bedtime       isosorbide mononitrate 30 MG 24 hr tablet   Commonly known as:  IMDUR   Quantity:  30 tablet   Refills:  11   Dose:  30 mg    Last time this was given:  30 mg on 2/25/2017  8:24 AM   Instructions:  Take 1 tablet (30 mg total) by mouth once daily.     Begin Date    AM    Noon    PM    Bedtime       lactulose 10 gram/15 mL solution   Commonly known as:  CHRONULAC   Refills:  0      Begin Date    AM    Noon    PM    Bedtime       lisinopril 20 MG tablet   Commonly known as:  PRINIVIL,ZESTRIL   Quantity:  90 tablet   Refills:  3   Comments:  PT STATES HE TAKES 2 TABS QD......    Last time this was given:  20 mg on 2/25/2017  9:55 AM   Instructions:  TAKE ONE TABLET BY MOUTH EVERY DAY     Begin Date    AM    Noon    PM    Bedtime       metformin 500 MG tablet   Commonly known as:  GLUCOPHAGE   Quantity:  100 tablet   Refills:  3   Dose:  500 mg    Instructions:  Take 1 tablet (500 mg total) by mouth once daily.     Begin Date    AM    Noon    PM    Bedtime       olanzapine 15 MG Tab   Commonly known as:  ZyPREXA   Refills:  0   Dose:  5 mg    Last time this was given:  5 mg on 2/24/2017  9:47 PM   Instructions:  Take 5 mg by mouth nightly.     Begin Date    AM    Noon    PM    Bedtime         STOP taking these medications     diltiaZEM 30 MG tablet    Commonly known as:  CARDIZEM            Where to Get Your Medications      You can get these medications from any pharmacy     Bring a paper prescription for each of these medications     amlodipine 10 MG tablet    pantoprazole 40 MG tablet                  Please bring to all follow up appointments:    1. A copy of your discharge instructions.  2. All medicines you are currently taking in their original bottles.  3. Identification and insurance card.    Please arrive 15 minutes ahead of scheduled appointment time.    Please call 24 hours in advance if you must reschedule your appointment and/or time.        Your Scheduled Appointments     Feb 27, 2017 11:20 AM CST   Established Patient - Geriatric with Jesse Hogan MD   [facility] ([facility])    [address] [place] [address] [address]  [facility] [address]   [phone]            Apr 05, 2017 10:00 AM CDT   Established Patient - Geriatric with Jesse Hogan MD   [facility] ([facility])    [address] [place] [address] [address]  [facility] [address]   [phone]              Follow-up Information     Follow up with Jesse Hogan MD On 2/27/2017.    Specialties:  Internal Medicine, Oncology, Hematology and Oncology    Why:  Monday at 11:20am    Contact information:    [address] [place] [address] [address]  [facility] [address]  [phone]          Follow up with Sergio Keller MD In 1 week.    Specialty:  Cardiology    Contact information:    [address]  [address]  [facility] CARDIOLOGY  [place] [address]  [phone]            Primary Diagnosis     Your primary diagnosis was:  Severe Uncontrolled High Blood Pressure      Admission Information     Date & Time Provider Department [facility]    2/23/2017  6:04 AM Jeses Hogan MD [facility] [id number]      Care Providers     Provider Role Specialty Primary office phone    Jesse Hogan MD Attending Provider Internal Medicine [phone]    Sergio Keller MD Consulting Physician  Cardiology  [phone]      Your Vitals Were     BP                   170/74           Recent Lab Values        1/25/2016 10/15/2016                        2:56 PM  2:31 AM          A1C 5.9 (A) 6.0          Comment for A1C at  2:31 AM on 10/15/2016:  According to ADA guidelines, hemoglobin A1C <7.0% represents  optimal control in non-pregnant diabetic patients.  Different  metrics may apply to specific populations.   Standards of Medical Care in Diabetes - 2016.  For the purpose of screening for the presence of diabetes:  <5.7%     Consistent with the absence of diabetes  5.7-6.4%  Consistent with increasing risk for diabetes   (prediabetes)  >or=6.5%  Consistent with diabetes  Currently no consensus exists for use of hemoglobin A1C  for diagnosis of diabetes for children.        Allergies as of 2/25/2017        Reactions    Antihistamines - Alkylamine Shortness Of Breath    Gabapentin Shortness Of Breath    Ultram [Tramadol] Shortness Of Breath, Palpitations, Other (See Comments)    Irritability    Anusol-hc [Hydrocortisone] Itching    Hyoscyamine Other (See Comments)    Depressed feeling    Butalbital-acetaminophen Palpitations    Zocor [Simvastatin] Nausea Only      [facility]     [facility] Nurse Care Line - 24/7 Assistance  Unless otherwise directed by your provider, please contact [facility], our nurse care line that is available for 24/7 assistance.     Registered nurses in the [facility] provide clinical advisement, health education, appointment booking, and other advisory services.  Call for this free service at [phone].        Advance Directives     An advance directive is a document which, in the event you are no longer able to make decisions for yourself, tells your healthcare team what kind of treatment you do or do not want to receive, or who you would like to make those decisions for you.  If you do not currently have an advance directive, [facility] encourages you to create one.   For more information call:  [phone]), [phone] ([phone]),  or log on to [email or web address].        Smoking Cessation     If you would like to quit smoking:   You may be eligible for free services if you are a [place] resident and started smoking cigarettes before September 1, 1988.  Call the Smoking Cessation Trust (SCT) toll free at (687) 152-7417 or (810) 508-1921.   Call 0-801-QUIT-NOW if you do not meet the above criteria.            Language Assistance Services     ATTENTION: Language assistance services are available, free of charge. Please call [phone].      ATENCIÓN: Si habla brenna, tiene a brooks disposición servicios gratuitos de asistencia lingüística. Llame al [phone].     CHÚ Ý: N?u b?n nói Ti?ng Vi?t, có các d?ch v? h? tr? ngôn ng? mi?n phí dành cho b?n. G?i s? [phone].        Stroke Education               [facility] complies with applicable Federal civil rights laws and does not discriminate on the basis of race, color, national origin, age, disability, or sex.

## 2017-02-23 NOTE — ED NOTES
Report received from PEDRO Choudhary. Updated pt on plan of care. Bed low w call bell in reach. AAO X3

## 2017-02-23 NOTE — ED TRIAGE NOTES
Pt presents to ER via EMS. PT states that he felt dizzy this morning when going to the restroom. Pt also states that he felt dizzy the previous night. Pt currently denies dizziness.

## 2017-02-24 PROBLEM — I95.1 ORTHOSTATIC HYPOTENSION: Status: ACTIVE | Noted: 2017-02-24

## 2017-02-24 PROBLEM — I16.0 HYPERTENSIVE URGENCY: Status: ACTIVE | Noted: 2017-02-24

## 2017-02-24 PROBLEM — Z86.73 HX OF STROKE WITHOUT RESIDUAL DEFICITS: Status: ACTIVE | Noted: 2017-02-24

## 2017-02-24 PROBLEM — R00.1 BRADYCARDIA: Status: ACTIVE | Noted: 2017-02-24

## 2017-02-24 LAB
ALBUMIN SERPL BCP-MCNC: 3.7 G/DL
ALP SERPL-CCNC: 47 U/L
ALT SERPL W/O P-5'-P-CCNC: 11 U/L
ANION GAP SERPL CALC-SCNC: 12 MMOL/L
AST SERPL-CCNC: 16 U/L
BASOPHILS # BLD AUTO: 0.05 K/UL
BASOPHILS NFR BLD: 0.5 %
BILIRUB SERPL-MCNC: 1.2 MG/DL
BUN SERPL-MCNC: 19 MG/DL
CALCIUM SERPL-MCNC: 9.4 MG/DL
CHLORIDE SERPL-SCNC: 100 MMOL/L
CO2 SERPL-SCNC: 26 MMOL/L
CREAT SERPL-MCNC: 0.9 MG/DL
DIFFERENTIAL METHOD: ABNORMAL
EOSINOPHIL # BLD AUTO: 0.1 K/UL
EOSINOPHIL NFR BLD: 0.8 %
ERYTHROCYTE [DISTWIDTH] IN BLOOD BY AUTOMATED COUNT: 14.3 %
EST. GFR  (AFRICAN AMERICAN): >60 ML/MIN/1.73 M^2
EST. GFR  (NON AFRICAN AMERICAN): >60 ML/MIN/1.73 M^2
GLUCOSE SERPL-MCNC: 74 MG/DL
HCT VFR BLD AUTO: 42 %
HGB BLD-MCNC: 13.6 G/DL
LYMPHOCYTES # BLD AUTO: 1.5 K/UL
LYMPHOCYTES NFR BLD: 16.5 %
MCH RBC QN AUTO: 28.9 PG
MCHC RBC AUTO-ENTMCNC: 32.4 %
MCV RBC AUTO: 89 FL
MONOCYTES # BLD AUTO: 1.1 K/UL
MONOCYTES NFR BLD: 11.5 %
NEUTROPHILS # BLD AUTO: 6.5 K/UL
NEUTROPHILS NFR BLD: 70.6 %
PLATELET # BLD AUTO: 222 K/UL
PMV BLD AUTO: 11.7 FL
POCT GLUCOSE: 124 MG/DL (ref 70–110)
POCT GLUCOSE: 68 MG/DL (ref 70–110)
POCT GLUCOSE: 72 MG/DL (ref 70–110)
POTASSIUM SERPL-SCNC: 4 MMOL/L
PROT SERPL-MCNC: 6.9 G/DL
RBC # BLD AUTO: 4.71 M/UL
SODIUM SERPL-SCNC: 138 MMOL/L
WBC # BLD AUTO: 9.17 K/UL

## 2017-02-24 PROCEDURE — 25000003 PHARM REV CODE 250: Performed by: EMERGENCY MEDICINE

## 2017-02-24 PROCEDURE — 85025 COMPLETE CBC W/AUTO DIFF WBC: CPT

## 2017-02-24 PROCEDURE — 20000000 HC ICU ROOM

## 2017-02-24 PROCEDURE — 63600175 PHARM REV CODE 636 W HCPCS: Performed by: EMERGENCY MEDICINE

## 2017-02-24 PROCEDURE — 80053 COMPREHEN METABOLIC PANEL: CPT

## 2017-02-24 PROCEDURE — 25000003 PHARM REV CODE 250: Performed by: INTERNAL MEDICINE

## 2017-02-24 PROCEDURE — 25000003 PHARM REV CODE 250

## 2017-02-24 PROCEDURE — 36415 COLL VENOUS BLD VENIPUNCTURE: CPT

## 2017-02-24 PROCEDURE — C9113 INJ PANTOPRAZOLE SODIUM, VIA: HCPCS | Performed by: EMERGENCY MEDICINE

## 2017-02-24 PROCEDURE — 63700000 PHARM REV CODE 250 ALT 637 W/O HCPCS: Performed by: EMERGENCY MEDICINE

## 2017-02-24 RX ORDER — AMLODIPINE BESYLATE 5 MG/1
10 TABLET ORAL DAILY
Status: DISCONTINUED | OUTPATIENT
Start: 2017-02-24 | End: 2017-02-24

## 2017-02-24 RX ORDER — CLONIDINE HYDROCHLORIDE 0.1 MG/1
0.2 TABLET ORAL 2 TIMES DAILY
Status: DISCONTINUED | OUTPATIENT
Start: 2017-02-24 | End: 2017-02-24

## 2017-02-24 RX ORDER — CLONIDINE HYDROCHLORIDE 0.1 MG/1
0.1 TABLET ORAL 2 TIMES DAILY
Status: DISCONTINUED | OUTPATIENT
Start: 2017-02-24 | End: 2017-02-24

## 2017-02-24 RX ORDER — NICARDIPINE HYDROCHLORIDE 0.2 MG/ML
5 INJECTION INTRAVENOUS CONTINUOUS
Status: DISCONTINUED | OUTPATIENT
Start: 2017-02-24 | End: 2017-02-25 | Stop reason: HOSPADM

## 2017-02-24 RX ORDER — CLONIDINE HYDROCHLORIDE 0.1 MG/1
0.1 TABLET ORAL 3 TIMES DAILY
Status: DISCONTINUED | OUTPATIENT
Start: 2017-02-24 | End: 2017-02-25 | Stop reason: HOSPADM

## 2017-02-24 RX ORDER — AMLODIPINE BESYLATE 5 MG/1
5 TABLET ORAL DAILY
Status: DISCONTINUED | OUTPATIENT
Start: 2017-02-24 | End: 2017-02-24

## 2017-02-24 RX ORDER — AMLODIPINE BESYLATE 5 MG/1
10 TABLET ORAL DAILY
Status: DISCONTINUED | OUTPATIENT
Start: 2017-02-24 | End: 2017-02-25 | Stop reason: HOSPADM

## 2017-02-24 RX ADMIN — ACETAMINOPHEN AND CODEINE PHOSPHATE 1 TABLET: 30; 300 TABLET ORAL at 10:02

## 2017-02-24 RX ADMIN — FENOFIBRATE 160 MG: 160 TABLET ORAL at 08:02

## 2017-02-24 RX ADMIN — PANTOPRAZOLE SODIUM 40 MG: 40 INJECTION, POWDER, FOR SOLUTION INTRAVENOUS at 10:02

## 2017-02-24 RX ADMIN — ISOSORBIDE MONONITRATE 30 MG: 30 TABLET, EXTENDED RELEASE ORAL at 08:02

## 2017-02-24 RX ADMIN — CLONIDINE HYDROCHLORIDE 0.1 MG: 0.1 TABLET ORAL at 08:02

## 2017-02-24 RX ADMIN — OLANZAPINE 5 MG: 2.5 TABLET, FILM COATED ORAL at 09:02

## 2017-02-24 RX ADMIN — FLUTICASONE PROPIONATE 1 SPRAY: 50 SPRAY, METERED NASAL at 01:02

## 2017-02-24 RX ADMIN — AMLODIPINE BESYLATE 10 MG: 5 TABLET ORAL at 02:02

## 2017-02-24 RX ADMIN — NICARDIPINE HYDROCHLORIDE 2.5 MG/HR: 0.2 INJECTION, SOLUTION INTRAVENOUS at 04:02

## 2017-02-24 RX ADMIN — ACETAMINOPHEN AND CODEINE PHOSPHATE 1 TABLET: 30; 300 TABLET ORAL at 12:02

## 2017-02-24 RX ADMIN — CLONIDINE HYDROCHLORIDE 0.1 MG: 0.1 TABLET ORAL at 02:02

## 2017-02-24 RX ADMIN — LISINOPRIL 20 MG: 20 TABLET ORAL at 08:02

## 2017-02-24 RX ADMIN — ATORVASTATIN CALCIUM 40 MG: 40 TABLET, FILM COATED ORAL at 08:02

## 2017-02-24 RX ADMIN — ASPIRIN 81 MG CHEWABLE TABLET 81 MG: 81 TABLET CHEWABLE at 08:02

## 2017-02-24 RX ADMIN — CLONIDINE HYDROCHLORIDE 0.1 MG: 0.1 TABLET ORAL at 09:02

## 2017-02-24 NOTE — PROGRESS NOTES
TN reviewed follow up appointment information and was informed of [facility]. Patient is in agreement and verbalized an understanding. Placed discharge information in blue discharge folder.  Patient was reminded to call the number written on the dry erase board for any other questions, concerns, or needs.    Patients nurse, Maryann,  informed that patient can discharge from  standpoint.

## 2017-02-24 NOTE — PROGRESS NOTES
"Dr. Hogan on unit this morning and informed TN that patient will discharge today. Call placed to MD's office [phone], spoke to Christy. Follow up appointment arranged for patient to see MD on Monday, February 27th at 11:20am. All information added to "follow up" tab.   "

## 2017-02-24 NOTE — PROGRESS NOTES
Placed a call to MD Hogan office to inform him of patient's SBP in the 200s. Patient has no complaints at this time. MD states to continue to monitor, no new orders noted.

## 2017-02-24 NOTE — PLAN OF CARE
Problem: Patient Care Overview  Goal: Plan of Care Review  Outcome: Ongoing (interventions implemented as appropriate)  Patient admitted to ICU from telemetry for SBP 220s. On admit SBP was 188, next reading 168. Placed on cardene gtt to titrate for goal SBP of less than 150. Patient is oriented. Remains on cardiac diet. Dietary restrictions explained. Oriented to ICU room, explained fall precautions and that his orders are up with assistance due to fall risk, call light within reach. Urinal at bedsie.  IVs remain intact, second placed when admitted to ICU.

## 2017-02-24 NOTE — H&P
"[facility]  History & Physical    SUBJECTIVE:     Chief Complaint/Reason for Admission: Dizziness     History of Present Illness:    Mr. Garcia is a pleasant 75 y/o gentleman with PAD and uncontrolled HTN who had an ischemic CVA in October 2016. He presented to the ER c/o sudden "dizziness" when standing from the sitting position. He reports having several episodes of dizziness throughout the night when walking to the bathroom. Associated symptoms included paresthesias of BUE and intermittent HA without blurred vision, CP, SOB, fever, or chills. Patient reports his blood pressure had been labile throughout the week with low heart rate measurements in the 40s. States his Clonidine Rx was recently increased from 0.1 to 0.2mg. His Cardiologist is Dr. Keller. In ER, EKG showed sinus bradycardia, rate 40s. T-wave inversions present in lead III and aVF which appear to be new when compared to prior EKGs. Labs revealed Troponin less than 0.006 and . Urinalysis negative for infection. Patient was orthostatic and treated with IV fluids. Chest x-ray negative for acute cardiopulmonary process. Head CT shows mild to moderate right inferior frontal and anterior temporal encephalomalacia unchanged. Patient has superimposed age appropriate generalized cerebral volume loss and findings suggestive of chronic microvascular ischemic change. No evidence of intracranial hemorrhage or new infarct. Patient was admitted for IV hydration, cardiology consult with symptomatic bradycardia, and further monitoring.     PTA Medications   Medication Sig    acetaminophen-codeine 300-30mg (TYLENOL #3) 300-30 mg Tab Take by mouth every 6 (six) hours as needed.    aspirin 81 MG Chew Take 81 mg by mouth once daily.    clobetasol (CLOBEX) 0.05 % shampoo     clobetasol (TEMOVATE) 0.05 % cream Apply topically continuous prn.    cloNIDine (CATAPRES) 0.1 MG tablet Take 0.1 mg by mouth 2 (two) times daily.    desonide " (DESOWEN) 0.05 % cream Apply topically 2 (two) times daily.    diltiazem (CARDIZEM) 30 MG tablet Take 1 tablet (30 mg total) by mouth every 6 (six) hours.    fenofibrate 160 MG Tab Take 1 tablet (160 mg total) by mouth once daily.    fluticasone (VERAMYST) 27.5 mcg/actuation nasal spray 1 spray by Nasal route once daily.    lactulose (CHRONULAC) 10 gram/15 mL solution     lisinopril (PRINIVIL,ZESTRIL) 20 MG tablet TAKE ONE TABLET BY MOUTH EVERY DAY    metformin (GLUCOPHAGE) 500 MG tablet Take 1 tablet (500 mg total) by mouth once daily.    olanzapine (ZYPREXA) 15 MG Tab Take 5 mg by mouth nightly.    atorvastatin (LIPITOR) 40 MG tablet Take 1 tablet (40 mg total) by mouth once daily.    fluocinonide-emollient 0.05 % Crea Apply topically 2 (two) times daily.    isosorbide mononitrate (IMDUR) 30 MG 24 hr tablet Take 1 tablet (30 mg total) by mouth once daily.       Review of patient's allergies indicates:   Allergen Reactions    Antihistamines - alkylamine Shortness Of Breath    Gabapentin Shortness Of Breath    Ultram [tramadol] Shortness Of Breath, Palpitations and Other (See Comments)     Irritability    Anusol-hc [hydrocortisone] Itching    Hyoscyamine Other (See Comments)     Depressed feeling    Butalbital-acetaminophen Palpitations    Zocor [simvastatin] Nausea Only       Past Medical History:   Diagnosis Date    Anxiety     Arthritis     Bipolar depression     Cataract     Depression     Hypertension     Peripheral arterial disease     Prediabetes     Stroke      Past Surgical History:   Procedure Laterality Date    BACK SURGERY      x 2    SHOULDER SURGERY Left     VASCULAR SURGERY Left     Left leg     Family History   Problem Relation Age of Onset    Heart failure Father      Social History   Substance Use Topics    Smoking status: Former Smoker     Packs/day: 2.00     Years: 50.00     Types: Cigarettes     Quit date: 1/25/2010    Smokeless tobacco: Never Used    Alcohol use  No      Review of Systems   Constitutional: Denies any weigh or appetite changes. Denies fever or chills  Eyes: no visual changes or HA  ENT: no nasal congestion. Denies sore throat with odynophagia   Respiratory: No cough, SOB, exertional dyspnea or hemoptysis   Cardiovascular: no chest pain or palpitations   Gastrointestinal: no nausea, vomiting or abdominal pain. Normal bowel habits   Hematologic/Lymphatic: No lymphadenopathy or night sweats. No mucocutaneous bleeding   Musculoskeletal: No ROM abnormalities or muscle weakness   Neurological: no seizures or tremors, paresthesias of BUE resolved. No facial numbness/tingling or slurred speech.    Skin: No rashes or lesions  Psych: Denies any anxiety, depression or insomnia    OBJECTIVE:     Vital Signs (Most Recent):  Temp: 98.1 °F (36.7 °C) (02/24/17 0300)  Pulse: 73 (02/24/17 0300)  Resp: 18 (02/24/17 0300)  BP: (!) 121/57 (02/24/17 0300)  SpO2: 96 % (02/24/17 0300)    Physical Exam:    General: NAD, resting comfortably in bed. Significant other present at bedside   Head: normocephalic, atraumatic. No facial droop    Eyes: conjunctivae pink, anicteric sclera. PERRL. EOM normal.   Throat: No erythema or post nasal discharge   Neck: Supple, no LAD   Lungs: CTAB   Heart: S1, S2, RRR   Abdomen: Active BS x 4 quadrants, soft, non tender. No hepatosplenomegaly.   Extremities: no cyanosis or edema.   Skin: turgor normal, no erythema or rashes.   MS: Moves all extremities equally.    Psy: pleasant, affect is congruent to mood     Laboratory:  CBC:   Recent Labs  Lab 02/24/17 0614   WBC 9.17   RBC 4.71   HGB 13.6*   HCT 42.0      MCV 89   MCH 28.9   MCHC 32.4     BMP:   Recent Labs  Lab 02/23/17  0651 02/24/17 0614    74    138   K 4.3 4.0   CL 96 100   CO2 30* 26   BUN 11 19   CREATININE 1.0 0.9   CALCIUM 9.8 9.4   MG 1.9  --      CMP:   Recent Labs  Lab 02/24/17 0614   GLU 74   CALCIUM 9.4   ALBUMIN 3.7   PROT 6.9      K 4.0   CO2 26       BUN 19   CREATININE 0.9   ALKPHOS 47*   ALT 11   AST 16   BILITOT 1.2*     Cardiac markers:   Recent Labs  Lab 02/23/17  2315   TROPONINI 0.045*     Microbiology Results (last 7 days)     ** No results found for the last 168 hours. **          Recent Labs  Lab 02/23/17  0729   COLORU Straw   SPECGRAV 1.010   PHUR 7.0   PROTEINUA 2+*   BACTERIA None   NITRITE Negative   LEUKOCYTESUR Negative   UROBILINOGEN Negative   HYALINECASTS 0     Diagnostic Results:  ECG: Reviewed  X-Ray: Reviewed  CT: Reviewed     CT head w/o contrast:    Mild/moderate right inferior frontal and anterior temporal encephalomalacia unchanged most compatible with prior contusion.  Superimposed Age-appropriate generalized cerebral volume loss with mild degree of patchy decreased attenuation supratentorial white matter suggestive for chronic microvascular ischemic change similarly seen on the prior.     No evidence for acute intracranial hemorrhage or definite new abnormal parenchymal attenuation. Clinical correlation and further evaluation as warranted.    ASSESSMENT/PLAN:     Active Hospital Problems    Diagnosis  POA    *Hypertensive urgency [I16.0]  No    Orthostatic hypotension [I95.1]  Yes    Bradycardia [R00.1]  Yes    Hx of stroke without residual deficits [Z86.73]  Not Applicable    Dizziness [R42]  Yes      Resolved Hospital Problems    Diagnosis Date Resolved POA   No resolved problems to display.     1. Hypertensive urgency - uncontrolled    - Resume home BP medications  - Cards consulted  - US renal artery stenosis - R/O secondary causes  - Add Norvasc 10 mg daily and increase Clonidine 0.1 mg to TID   - If unable to control BP with PO medications, will transfer to ICU for management with Cardene gtt   - Goal SBP <150  - Cardiac diet     2. Dizziness - most likely secondary to orthostatic hypotension  - Resolved    3. Orthostatic hypotension - Most likely drug induced  - Resolved post IV hydration     4. Bradycardia -  symptomatic in ER  - Cards consulted  - Resolved   - NSR    5. Hx of ischemic CVA - no residual deficits   - No new infarct on CT  - Continue statin and ASA  - Need BP control - adjusted medication regimen     6. GI Prophylaxis  - PPI IV    7. DVT Prophylaxis  - TEDs/SCDs      Jesse Hogan M.D  Internal Medicine & Geriatric Medicine  Hematology & Oncology  Palliative Medicine    [address] [facility], [address]  [place] [address]  [phone] (Office)  [phone] (Fax)

## 2017-02-24 NOTE — PROGRESS NOTES
[facility]  Cardiology  Progress Note    Patient Name: Raymond Garcia  MRN: [id number]  Admission Date: 2/23/2017  Hospital Length of Stay: 0 days  Code Status: Full Code   Attending Physician: Jesse Hogan MD   Primary Care Physician: Jesse Hogan MD  Expected Discharge Date:   Principal Problem:HTN    Subjective:     Hospital Course: Mr. Garcia is a 76 year old gentleman with severe hypertension, admitted yesterday with orthostatic hypotension with clonidine dose recently increased. Recommendations for antihypertensives yesterday did not translate to the orders. He remains hypertensive today. Asymptomatic.        Review of Systems   Constitution: Negative.   HENT: Negative.    Eyes: Negative.    Cardiovascular: Negative.    Respiratory: Negative.    Endocrine: Negative.    Skin: Negative.    Musculoskeletal: Negative.    Gastrointestinal: Negative.    Genitourinary: Negative.    Neurological: Negative.    Psychiatric/Behavioral: Negative.      Objective:     Vital Signs (Most Recent):  Temp: 98.8 °F (37.1 °C) (02/24/17 1244)  Pulse: 81 (02/24/17 1244)  Resp: 18 (02/24/17 1244)  BP: (!) 223/92 (02/24/17 1330)  SpO2: 96 % (02/24/17 1244) Vital Signs (24h Range):  Temp:  [98.1 °F (36.7 °C)-98.9 °F (37.2 °C)] 98.8 °F (37.1 °C)  Pulse:  [68-81] 81  Resp:  [18] 18  SpO2:  [95 %-97 %] 96 %  BP: (121-223)/(57-92) 223/92     Weight: 63.5 kg (140 lb)  Body mass index is 18.99 kg/(m^2).    SpO2: 96 %  O2 Device (Oxygen Therapy): room air      Intake/Output Summary (Last 24 hours) at 02/24/17 1404  Last data filed at 02/24/17 1300   Gross per 24 hour   Intake              540 ml   Output              275 ml   Net              265 ml       Lines/Drains/Airways     Peripheral Intravenous Line                 Peripheral IV - Single Lumen 02/23/17 1257 Right Hand 1 day                Physical Exam   Constitutional: He is oriented to person, place, and time. He appears well-developed and  well-nourished.   HENT:   Head: Normocephalic and atraumatic.   Eyes: Conjunctivae and EOM are normal. Pupils are equal, round, and reactive to light. Right eye exhibits no discharge. Left eye exhibits no discharge.   Neck: Normal range of motion. Neck supple. No JVD present. No thyromegaly present.   Cardiovascular: Normal rate, regular rhythm and normal heart sounds.  Exam reveals no gallop.    No murmur heard.  Pulmonary/Chest: Breath sounds normal. No respiratory distress. He has no wheezes. He has no rales.   Abdominal: Soft. Bowel sounds are normal. He exhibits no distension. There is no tenderness.   Musculoskeletal: Normal range of motion. He exhibits no edema.   Neurological: He is alert and oriented to person, place, and time.   Skin: Skin is warm and dry.       Significant Labs:   BMP:   Recent Labs  Lab 02/23/17  0651 02/24/17  0614    74    138   K 4.3 4.0   CL 96 100   CO2 30* 26   BUN 11 19   CREATININE 1.0 0.9   CALCIUM 9.8 9.4   MG 1.9  --    , CMP   Recent Labs  Lab 02/23/17  0651 02/24/17  0614    138   K 4.3 4.0   CL 96 100   CO2 30* 26    74   BUN 11 19   CREATININE 1.0 0.9   CALCIUM 9.8 9.4   PROT 7.6 6.9   ALBUMIN 4.0 3.7   BILITOT 0.7 1.2*   ALKPHOS 50* 47*   AST 16 16   ALT 10 11   ANIONGAP 10 12   ESTGFRAFRICA >60 >60   EGFRNONAA >60 >60   , CBC   Recent Labs  Lab 02/23/17  0651 02/24/17  0614   WBC 7.59 9.17   HGB 13.7* 13.6*   HCT 42.7 42.0    222   , INR   Recent Labs  Lab 02/23/17  0651   INR 1.0    and Troponin   Recent Labs  Lab 02/23/17  0651 02/23/17  1658 02/23/17  2315   TROPONINI <0.006 0.008 0.045*       Assessment and Plan:         Active Diagnoses:    Diagnosis Date Noted POA    Dizziness [R42] 02/23/2017 Yes      Problems Resolved During this Admission:    Diagnosis Date Noted Date Resolved POA       VTE Risk Mitigation         Ordered     Medium Risk of VTE  Once      02/23/17 1352     Place NO hose  Until discontinued      02/23/17 1352      Place sequential compression device  Until discontinued      02/23/17 1352       Hypertension, not at goal. Continue lisinopril, clonidine 0.1mg TID, isosorbide. Add amlodipine 10mg today. As the active problem remains uncontrolled hypertension without any cardiovascular symptoms will sign off. Patient should follow up with us in clinic in 1 week after discharge.  Shruti Sewell NP  Cardiology  [facility]

## 2017-02-24 NOTE — PLAN OF CARE
Problem: Fall Risk (Adult)  Goal: Absence of Falls  Patient will demonstrate the desired outcomes by discharge/transition of care.   Outcome: Ongoing (interventions implemented as appropriate)    02/24/17 1133   Fall Risk (Adult)   Absence of Falls making progress toward outcome         Problem: Patient Care Overview  Goal: Plan of Care Review  Outcome: Ongoing (interventions implemented as appropriate)    02/24/17 1133   Coping/Psychosocial   Plan Of Care Reviewed With patient;spouse         Problem: Pressure Ulcer Risk (Gui Scale) (Adult,Obstetrics,Pediatric)  Goal: Skin Integrity  Patient will demonstrate the desired outcomes by discharge/transition of care.   Outcome: Ongoing (interventions implemented as appropriate)    02/24/17 1133   Pressure Ulcer Risk (Gui Scale) (Adult,Obstetrics,Pediatric)   Skin Integrity making progress toward outcome

## 2017-02-24 NOTE — PROGRESS NOTES
Report received from PEDRO Epperson. Visualized patient and assessed patient's overall condition and appearance.  No complaints. NAD noted. Will continue to monitor.

## 2017-02-24 NOTE — PROGRESS NOTES
Informed patient that he will be transferring to ICU to be on a drip to help decrease his blood pressure.

## 2017-02-24 NOTE — PROGRESS NOTES
Placed a call to [facility] cardiology to inform MD of patient's elevated blood pressure, 223/92, was informed that the [facility] NP will be making rounds shortly. Will continue to monitor.

## 2017-02-24 NOTE — PROGRESS NOTES
Patient was transported to ICU via bed with chart and belongings, escorted by charge nurse and transport personnel. NAD noted.

## 2017-02-24 NOTE — PROGRESS NOTES
Report received from Ene VASQUEZ. Nursing rounds completed. Assessed pt's general appearance/condition.  Patient resting quietly in bed, respirations even/unlabored, no signs of distress noted, pt denies pain at this time.  Will continue to monitor.

## 2017-02-24 NOTE — PROGRESS NOTES
WRITTEN HEALTHCARE DISCHARGE INFORMATION     If you are unable to make your follow up appointments, please call the number listed and reschedule this appointment.     After discharge, if you need assistance, you can call [facility] for 24/7 assistance at [phone]    Thank you for choosing [facility] and allowing us to care for you.   From your care management team: Marleny Sood & Allie [phone], [phone] or [phone]    You should receive a call from [facility] within 48-72 hours to help manage your care after discharge. Please try to make sure that you answer your phone for this important phone call.     Follow-up Information     Follow up with Jesse Hogan MD On 2/27/2017.    Specialties:  Internal Medicine, Oncology, Hematology and Oncology    Why:  Monday at 11:20am    Contact information:    [address]  [address]  [place] [address]  [phone]

## 2017-02-24 NOTE — PLAN OF CARE
Problem: Fall Risk (Adult)  Goal: Absence of Falls  Patient will demonstrate the desired outcomes by discharge/transition of care.   Outcome: Ongoing (interventions implemented as appropriate)    02/24/17 0736   Fall Risk (Adult)   Absence of Falls making progress toward outcome         Problem: Patient Care Overview  Goal: Plan of Care Review  Outcome: Ongoing (interventions implemented as appropriate)    02/24/17 0736   Coping/Psychosocial   Plan Of Care Reviewed With patient         Problem: Pressure Ulcer Risk (Gui Scale) (Adult,Obstetrics,Pediatric)  Goal: Skin Integrity  Patient will demonstrate the desired outcomes by discharge/transition of care.   Outcome: Ongoing (interventions implemented as appropriate)    02/24/17 0736   Pressure Ulcer Risk (Gui Scale) (Adult,Obstetrics,Pediatric)   Skin Integrity making progress toward outcome

## 2017-02-25 VITALS
WEIGHT: 140 LBS | TEMPERATURE: 98 F | DIASTOLIC BLOOD PRESSURE: 74 MMHG | HEART RATE: 56 BPM | HEIGHT: 72 IN | SYSTOLIC BLOOD PRESSURE: 170 MMHG | BODY MASS INDEX: 18.96 KG/M2 | RESPIRATION RATE: 13 BRPM | OXYGEN SATURATION: 90 %

## 2017-02-25 LAB
ALBUMIN SERPL BCP-MCNC: 3.6 G/DL
ALP SERPL-CCNC: 44 U/L
ALT SERPL W/O P-5'-P-CCNC: 10 U/L
ANION GAP SERPL CALC-SCNC: 11 MMOL/L
AST SERPL-CCNC: 14 U/L
BASOPHILS # BLD AUTO: 0.06 K/UL
BASOPHILS NFR BLD: 0.8 %
BILIRUB SERPL-MCNC: 0.9 MG/DL
BUN SERPL-MCNC: 23 MG/DL
CALCIUM SERPL-MCNC: 9.3 MG/DL
CHLORIDE SERPL-SCNC: 99 MMOL/L
CO2 SERPL-SCNC: 26 MMOL/L
CREAT SERPL-MCNC: 1 MG/DL
DIFFERENTIAL METHOD: ABNORMAL
EOSINOPHIL # BLD AUTO: 0.2 K/UL
EOSINOPHIL NFR BLD: 2.4 %
ERYTHROCYTE [DISTWIDTH] IN BLOOD BY AUTOMATED COUNT: 14.4 %
EST. GFR  (AFRICAN AMERICAN): >60 ML/MIN/1.73 M^2
EST. GFR  (NON AFRICAN AMERICAN): >60 ML/MIN/1.73 M^2
GLUCOSE SERPL-MCNC: 104 MG/DL
HCT VFR BLD AUTO: 41.5 %
HGB BLD-MCNC: 13.5 G/DL
LYMPHOCYTES # BLD AUTO: 1.6 K/UL
LYMPHOCYTES NFR BLD: 21 %
MCH RBC QN AUTO: 29.2 PG
MCHC RBC AUTO-ENTMCNC: 32.5 %
MCV RBC AUTO: 90 FL
MONOCYTES # BLD AUTO: 0.9 K/UL
MONOCYTES NFR BLD: 12.1 %
NEUTROPHILS # BLD AUTO: 4.7 K/UL
NEUTROPHILS NFR BLD: 63.7 %
PLATELET # BLD AUTO: 217 K/UL
PMV BLD AUTO: 11.6 FL
POCT GLUCOSE: 102 MG/DL (ref 70–110)
POCT GLUCOSE: 107 MG/DL (ref 70–110)
POCT GLUCOSE: 141 MG/DL (ref 70–110)
POTASSIUM SERPL-SCNC: 4.2 MMOL/L
PROT SERPL-MCNC: 6.6 G/DL
RBC # BLD AUTO: 4.62 M/UL
SODIUM SERPL-SCNC: 136 MMOL/L
WBC # BLD AUTO: 7.43 K/UL

## 2017-02-25 PROCEDURE — 25000003 PHARM REV CODE 250: Performed by: EMERGENCY MEDICINE

## 2017-02-25 PROCEDURE — 85025 COMPLETE CBC W/AUTO DIFF WBC: CPT

## 2017-02-25 PROCEDURE — 63700000 PHARM REV CODE 250 ALT 637 W/O HCPCS: Performed by: EMERGENCY MEDICINE

## 2017-02-25 PROCEDURE — 63600175 PHARM REV CODE 636 W HCPCS: Performed by: EMERGENCY MEDICINE

## 2017-02-25 PROCEDURE — 80053 COMPREHEN METABOLIC PANEL: CPT

## 2017-02-25 PROCEDURE — 25000003 PHARM REV CODE 250: Performed by: INTERNAL MEDICINE

## 2017-02-25 PROCEDURE — 25000003 PHARM REV CODE 250

## 2017-02-25 PROCEDURE — 36415 COLL VENOUS BLD VENIPUNCTURE: CPT

## 2017-02-25 PROCEDURE — C9113 INJ PANTOPRAZOLE SODIUM, VIA: HCPCS | Performed by: EMERGENCY MEDICINE

## 2017-02-25 RX ORDER — PANTOPRAZOLE SODIUM 40 MG/1
40 TABLET, DELAYED RELEASE ORAL DAILY
Qty: 30 TABLET | Refills: 11 | Status: SHIPPED | OUTPATIENT
Start: 2017-02-25 | End: 2018-02-25

## 2017-02-25 RX ORDER — AMLODIPINE BESYLATE 10 MG/1
10 TABLET ORAL DAILY
Qty: 30 TABLET | Refills: 2 | Status: SHIPPED | OUTPATIENT
Start: 2017-02-25 | End: 2017-02-25

## 2017-02-25 RX ORDER — PANTOPRAZOLE SODIUM 40 MG/1
40 TABLET, DELAYED RELEASE ORAL DAILY
Qty: 30 TABLET | Refills: 11 | Status: SHIPPED | OUTPATIENT
Start: 2017-02-25 | End: 2017-02-25

## 2017-02-25 RX ORDER — AMLODIPINE BESYLATE 10 MG/1
10 TABLET ORAL DAILY
Qty: 30 TABLET | Refills: 2 | Status: SHIPPED | OUTPATIENT
Start: 2017-02-25 | End: 2017-03-06

## 2017-02-25 RX ADMIN — ASPIRIN 81 MG CHEWABLE TABLET 81 MG: 81 TABLET CHEWABLE at 08:02

## 2017-02-25 RX ADMIN — LISINOPRIL 20 MG: 20 TABLET ORAL at 09:02

## 2017-02-25 RX ADMIN — ACETAMINOPHEN AND CODEINE PHOSPHATE 1 TABLET: 30; 300 TABLET ORAL at 06:02

## 2017-02-25 RX ADMIN — AMLODIPINE BESYLATE 10 MG: 5 TABLET ORAL at 09:02

## 2017-02-25 RX ADMIN — CLONIDINE HYDROCHLORIDE 0.1 MG: 0.1 TABLET ORAL at 02:02

## 2017-02-25 RX ADMIN — ATORVASTATIN CALCIUM 40 MG: 40 TABLET, FILM COATED ORAL at 08:02

## 2017-02-25 RX ADMIN — PANTOPRAZOLE SODIUM 40 MG: 40 INJECTION, POWDER, FOR SOLUTION INTRAVENOUS at 08:02

## 2017-02-25 RX ADMIN — FENOFIBRATE 160 MG: 160 TABLET ORAL at 08:02

## 2017-02-25 RX ADMIN — CLONIDINE HYDROCHLORIDE 0.1 MG: 0.1 TABLET ORAL at 06:02

## 2017-02-25 RX ADMIN — FLUTICASONE PROPIONATE 1 SPRAY: 50 SPRAY, METERED NASAL at 08:02

## 2017-02-25 RX ADMIN — ISOSORBIDE MONONITRATE 30 MG: 30 TABLET, EXTENDED RELEASE ORAL at 08:02

## 2017-02-25 NOTE — PLAN OF CARE
Problem: Patient Care Overview  Goal: Plan of Care Review  Outcome: Ongoing (interventions implemented as appropriate)  Patient remains in ICU overnight. Cardene drip turned off at 2325 last night. BP has still been labile depending on when patient is sleeping, SBP increased 173-200 at approximately 0600, scheduled clonidine given, last SBP at 0700 was 133. Voids per urinal without difficulty, no BM. Moves in bed independently. Tylenol 3 given once for generalized pain. No falls, injuries, or skin breakdown throughout shift.

## 2017-02-25 NOTE — PLAN OF CARE
Problem: Patient Care Overview  Goal: Plan of Care Review  Outcome: Outcome(s) achieved Date Met:  02/25/17  Patient remained free from falls/injury. Saline locks x 2 removed; no redness or swelling @ sites.  Accu check 141 and VSS upon discharge. Discharge instructions given and explained, with prescriptions also . Belongings gathered by spouse. Patient escorted via wheelchair to car by this nurse. No complaints voice upon discharge.

## 2017-02-25 NOTE — PLAN OF CARE
02/25/17 1450   Final Note   Assessment Type Final Discharge Note   Discharge Disposition Home   Discharge planning education complete? Yes   What phone number can be called within the next 1-3 days to see how you are doing after discharge? ([phone] )   Hospital Follow Up  Appt(s) scheduled? Yes   Discharge plans and expectations educations in teach back method with documentation complete? Yes   Offered [facility] Bedside Delivery? n/a   Discharge[facility]ospital En[facility] n/a   Referral to Outpatient Case Management complete? n/a   Referral to / orders for Home Health Complete? n/a   30 day supply of medicines given at discharge, if documented non-compliance / non-adherence? n/a   Any social issues identified prior to discharge? No   Did you assess the readiness or willingness of the family or caregiver to support self management of care? Yes

## 2017-02-25 NOTE — DISCHARGE SUMMARY
"[facility] Medicine  Discharge Summary      Patient Name: Raymond Garcia  MRN: [id number]  Admission Date: 2/23/2017  Hospital Length of Stay: 1 days  Discharge Date and Time: 2/25/17  Attending Physician: Jesse Hogan MD   Discharging Provider: Preston Hughes MD  Primary Care Provider: Jesse Hogan MD      HPI:   Mr. Garcia is a pleasant 75 y/o gentleman with PAD and uncontrolled HTN who had an ischemic CVA in October 2016. He presented to the ER c/o sudden "dizziness" when standing from the sitting position. He reports having several episodes of dizziness throughout the night when walking to the bathroom. Associated symptoms included paresthesias of BUE and intermittent HA without blurred vision, CP, SOB, fever, or chills. Patient reports his blood pressure had been labile throughout the week with low heart rate measurements in the 40s. States his Clonidine Rx was recently increased from 0.1 to 0.2mg. His Cardiologist is Dr. Keller. In ER, EKG showed sinus bradycardia, rate 40s. T-wave inversions present in lead III and aVF which appear to be new when compared to prior EKGs. Labs revealed Troponin less than 0.006 and . Urinalysis negative for infection. Patient was orthostatic and treated with IV fluids. Chest x-ray negative for acute cardiopulmonary process. Head CT shows mild to moderate right inferior frontal and anterior temporal encephalomalacia unchanged. Patient has superimposed age appropriate generalized cerebral volume loss and findings suggestive of chronic microvascular ischemic change. No evidence of intracranial hemorrhage or new infarct. Patient was admitted for IV hydration, cardiology consult with symptomatic bradycardia, and further monitoring.       Indwelling Lines/Drains at time of discharge:   Lines/Drains/Airways          No matching active lines, drains, or airways        Hospital Course:   Pt consulted to [facility] cards.  Meds " adjusted.  Recs:  Continue lisinopril, clonidine 0.1mg TID, isosorbide. Add amlodipine 10mg today. As the active problem remains uncontrolled hypertension without any cardiovascular symptoms will sign off. Patient should follow up with us in clinic in 1 week after discharge.       Consults:   Consults         Status Ordering Provider     Inpatient consult to Cardiology  Once     Provider:  Sergio Keller MD    Completed CARRIERLUCINDA          Significant Diagnostic Studies:  cxr 2/23:  Mediastinal structures are midline. Cardiac silhouette is normal in size. Lung volumes are normal and symmetric. No pulmonary consolidation.  No pneumothorax or pleural effusion. No free air beneath the diaphragm. Bones demonstrate no acute abnormalities.         Ct head 2/23:   Mild/moderate right inferior frontal and anterior temporal encephalomalacia unchanged most compatible with prior contusion.  Superimposed Age-appropriate generalized cerebral volume loss with mild degree of patchy decreased attenuation supratentorial white matter suggestive for chronic microvascular ischemic change similarly seen on the prior.     No evidence for acute intracranial hemorrhage or definite new abnormal parenchymal attenuation. Clinical correlation and further evaluation as warranted.    Renal artery study:  1.  Asymmetric elevated velocities and renal artery/aortic ratio of 2.6 on the left which suggests some degree of stenosis, however does not meet threshold criteria for diagnosis of renal artery stenosis (ratio greater than 3-3.5).  If clinical concern, further evaluation could be performed with CTA versus conventional angiogram.    2.  Findings suggesting mild chronic medical renal disease.  No evidence of hydronephrosis.    Pending Diagnostic Studies:     None        Final Active Diagnoses:    Diagnosis Date Noted POA    PRINCIPAL PROBLEM:  Hypertensive urgency [I16.0] 02/24/2017 No    Orthostatic hypotension [I95.1] 02/24/2017  Yes    Bradycardia [R00.1] 02/24/2017 Yes    Hx of stroke without residual deficits [Z86.73] 02/24/2017 Not Applicable    Dizziness [R42] 02/23/2017 Yes      Problems Resolved During this Admission:    Diagnosis Date Noted Date Resolved POA      * Hypertensive urgency  BP significantly improved - down to 115/56  Currently 150/90; getting dose of clonidine now  His overall status is improved enough to return home      Dizziness  Not currently overtly dizzy  Feels better than admit      Orthostatic hypotension  Has received IVF.  Symptoms resolved      Bradycardia  Current rate is 56, but bp is not low      Hx of stroke without residual deficits  Stable - awake, alert, conversant        Discharged Condition: good - back to baseline    Disposition: Home or Self Care    Follow Up:  [facility] - make appt 1 week  Follow-up Information     Follow up with Jesse Hogan MD On 2/27/2017.    Specialties:  Internal Medicine, Oncology, Hematology and Oncology    Why:  Monday at 11:20am    Contact information:    [address] [place] [address]  [address] [place] [address] [id number] [phone]          Patient Instructions:   No discharge procedures on file.  Medications:     Medication List      START taking these medications          amlodipine 10 MG tablet   Commonly known as:  NORVASC   Take 1 tablet (10 mg total) by mouth once daily.       pantoprazole 40 MG tablet   Commonly known as:  PROTONIX   Take 1 tablet (40 mg total) by mouth once daily.         CHANGE how you take these medications          clobetasol 0.05 % shampoo   Commonly known as:  CLOBEX   What changed:  Another medication with the same name was removed. Continue taking this medication, and follow the directions you see here.         CONTINUE taking these medications          acetaminophen-codeine 300-30mg 300-30 mg Tab   Commonly known as:  TYLENOL #3       aspirin 81 MG Chew       atorvastatin 40 MG tablet   Commonly known as:  LIPITOR   Take 1 tablet (40 mg  total) by mouth once daily.       cloNIDine 0.1 MG tablet   Commonly known as:  CATAPRES       desonide 0.05 % cream   Commonly known as:  DESOWEN       fenofibrate 160 MG Tab   Take 1 tablet (160 mg total) by mouth once daily.       fluocinonide-emollient 0.05 % Crea       fluticasone 27.5 mcg/actuation nasal spray   Commonly known as:  VERAMYST   1 spray by Nasal route once daily.       isosorbide mononitrate 30 MG 24 hr tablet   Commonly known as:  IMDUR   Take 1 tablet (30 mg total) by mouth once daily.       lactulose 10 gram/15 mL solution   Commonly known as:  CHRONULAC       lisinopril 20 MG tablet   Commonly known as:  PRINIVIL,ZESTRIL   TAKE ONE TABLET BY MOUTH EVERY DAY       metformin 500 MG tablet   Commonly known as:  GLUCOPHAGE   Take 1 tablet (500 mg total) by mouth once daily.       olanzapine 15 MG Tab   Commonly known as:  ZyPREXA         STOP taking these medications          diltiaZEM 30 MG tablet   Commonly known as:  CARDIZEM            Where to Get Your Medications      You can get these medications from any pharmacy     Bring a paper prescription for each of these medications     amlodipine 10 MG tablet    pantoprazole 40 MG tablet               Preston Hughes MD  Department of Hospital Medicine  [facility]

## 2017-02-25 NOTE — ASSESSMENT & PLAN NOTE
BP significantly improved - down to 115/56  Currently 150/90; getting dose of clonidine now  His overall status is improved enough to return home

## 2017-02-25 NOTE — PLAN OF CARE
02/25/17 1618   Discharge Assessment   Assessment Type Discharge Planning Assessment   Patient discharged prior to completion of DC needs assessment.

## 2017-03-04 ENCOUNTER — HOSPITAL ENCOUNTER (EMERGENCY)
Facility: HOSPITAL | Age: 77
Discharge: HOME | End: 2017-03-06
Attending: EMERGENCY MEDICINE
Payer: MEDICARE

## 2017-03-04 VITALS
HEART RATE: 70 BPM | HEIGHT: 67 IN | BODY MASS INDEX: 21.97 KG/M2 | SYSTOLIC BLOOD PRESSURE: 163 MMHG | DIASTOLIC BLOOD PRESSURE: 70 MMHG | TEMPERATURE: 98 F | OXYGEN SATURATION: 96 % | WEIGHT: 140 LBS | RESPIRATION RATE: 75 BRPM

## 2017-03-04 DIAGNOSIS — I10 HYPERTENSION: ICD-10-CM

## 2017-03-04 DIAGNOSIS — I10 HTN, GOAL BELOW 140/90: ICD-10-CM

## 2017-03-04 DIAGNOSIS — T88.7XXA NON-DOSE-RELATED ADVERSE REACTION TO MEDICATION, INITIAL ENCOUNTER: Primary | ICD-10-CM

## 2017-03-04 LAB
ALBUMIN SERPL BCP-MCNC: 4.2 G/DL
ALP SERPL-CCNC: 44 U/L
ALT SERPL W/O P-5'-P-CCNC: 12 U/L
ANION GAP SERPL CALC-SCNC: 12 MMOL/L
AST SERPL-CCNC: 16 U/L
BASOPHILS # BLD AUTO: 0.06 K/UL
BASOPHILS NFR BLD: 0.9 %
BILIRUB SERPL-MCNC: 0.4 MG/DL
BNP SERPL-MCNC: 26 PG/ML
BUN SERPL-MCNC: 11 MG/DL
CALCIUM SERPL-MCNC: 9.9 MG/DL
CHLORIDE SERPL-SCNC: 96 MMOL/L
CO2 SERPL-SCNC: 28 MMOL/L
CREAT SERPL-MCNC: 1 MG/DL
DIFFERENTIAL METHOD: ABNORMAL
EOSINOPHIL # BLD AUTO: 0.2 K/UL
EOSINOPHIL NFR BLD: 2.4 %
ERYTHROCYTE [DISTWIDTH] IN BLOOD BY AUTOMATED COUNT: 14 %
EST. GFR  (AFRICAN AMERICAN): >60 ML/MIN/1.73 M^2
EST. GFR  (NON AFRICAN AMERICAN): >60 ML/MIN/1.73 M^2
GLUCOSE SERPL-MCNC: 102 MG/DL
HCT VFR BLD AUTO: 40.9 %
HGB BLD-MCNC: 13.2 G/DL
LYMPHOCYTES # BLD AUTO: 1.3 K/UL
LYMPHOCYTES NFR BLD: 17.7 %
MCH RBC QN AUTO: 28.9 PG
MCHC RBC AUTO-ENTMCNC: 32.3 %
MCV RBC AUTO: 90 FL
MONOCYTES # BLD AUTO: 0.9 K/UL
MONOCYTES NFR BLD: 13 %
NEUTROPHILS # BLD AUTO: 4.6 K/UL
NEUTROPHILS NFR BLD: 65.9 %
PLATELET # BLD AUTO: 225 K/UL
PMV BLD AUTO: 11.3 FL
POTASSIUM SERPL-SCNC: 4.4 MMOL/L
PROT SERPL-MCNC: 7.6 G/DL
RBC # BLD AUTO: 4.57 M/UL
SODIUM SERPL-SCNC: 136 MMOL/L
TROPONIN I SERPL DL<=0.01 NG/ML-MCNC: 0.01 NG/ML
WBC # BLD AUTO: 7.05 K/UL

## 2017-03-04 PROCEDURE — 85025 COMPLETE CBC W/AUTO DIFF WBC: CPT

## 2017-03-04 PROCEDURE — 83880 ASSAY OF NATRIURETIC PEPTIDE: CPT

## 2017-03-04 PROCEDURE — 84484 ASSAY OF TROPONIN QUANT: CPT

## 2017-03-04 PROCEDURE — 80053 COMPREHEN METABOLIC PANEL: CPT

## 2017-03-04 PROCEDURE — 99284 EMERGENCY DEPT VISIT MOD MDM: CPT

## 2017-03-04 RX ORDER — LISINOPRIL 20 MG/1
20 TABLET ORAL 2 TIMES DAILY
Qty: 60 TABLET | Refills: 3 | Status: SHIPPED | OUTPATIENT
Start: 2017-03-04 | End: 2019-05-09 | Stop reason: DRUGHIGH

## 2017-03-04 NOTE — ED AVS SNAPSHOT
[facility]  [address] [place] [address]               Raymond Garcia   3/4/2017  9:09 PM   ED    Description:  Male : 1940   Department:  [facility]           Your Care was Coordinated By:     Provider Role From To    Dorina Bustamante MD Attending Provider [id number] --      Reason for Visit     Medication Reaction           Diagnoses this Visit        Comments    Non-dose-related adverse reaction to medication, initial encounter    -  Primary     Hypertension         HTN, goal below 140/90           ED Disposition     ED Disposition Condition Comment    Discharge             To Do List           Follow-up Information     Follow up with Jesse Hogan MD In 2 days.    Specialties:  Internal Medicine, Oncology, Hematology and Oncology    Contact information:    [address]  [address]  [place] [address]  [phone]         These Medications        Disp Refills Start End    lisinopril (PRINIVIL,ZESTRIL) 20 MG tablet 60 tablet 3 3/4/2017     Take 1 tablet (20 mg total) by mouth 2 (two) times daily. - Oral    Pharmacy: [facility] [address], [place] [address] [address] Ph #: [phone]       Notes to Pharmacy: PT STATES HE TAKES 2 TABS QD......      [facility]     [facility] -  Assistance  Registered nurses in the [facility] provide clinical advisement, health education, appointment booking, and other advisory services.  Call for this free service at [phone].             Medications           Message regarding Medications     Verify the changes and/or additions to your medication regime listed below are the same as discussed with your clinician today.  If any of these changes or additions are incorrect, please notify your healthcare provider.        CHANGE how you are taking these medications     Start Taking Instead of    lisinopril (PRINIVIL,ZESTRIL) 20  "MG tablet lisinopril (PRINIVIL,ZESTRIL) 20 MG tablet    Dosage:  Take 1 tablet (20 mg total) by mouth 2 (two) times daily. Dosage:  TAKE ONE TABLET BY MOUTH EVERY DAY           Verify that the below list of medications is an accurate representation of the medications you are currently taking.  If none reported, the list may be blank. If incorrect, please contact your healthcare provider. Carry this list with you in case of emergency.           Current Medications     acetaminophen-codeine 300-30mg (TYLENOL #3) 300-30 mg Tab Take by mouth every 6 (six) hours as needed.    amlodipine (NORVASC) 10 MG tablet Take 1 tablet (10 mg total) by mouth once daily.    aspirin 81 MG Chew Take 81 mg by mouth once daily.    atorvastatin (LIPITOR) 40 MG tablet Take 1 tablet (40 mg total) by mouth once daily.    cloNIDine (CATAPRES) 0.1 MG tablet Take 0.1 mg by mouth 2 (two) times daily.    desonide (DESOWEN) 0.05 % cream Apply topically 2 (two) times daily.    fenofibrate 160 MG Tab Take 1 tablet (160 mg total) by mouth once daily.    isosorbide mononitrate (IMDUR) 30 MG 24 hr tablet Take 1 tablet (30 mg total) by mouth once daily.    lactulose (CHRONULAC) 10 gram/15 mL solution     metformin (GLUCOPHAGE) 500 MG tablet Take 1 tablet (500 mg total) by mouth once daily.    olanzapine (ZYPREXA) 15 MG Tab Take 5 mg by mouth nightly.    pantoprazole (PROTONIX) 40 MG tablet Take 1 tablet (40 mg total) by mouth once daily.    VERAMYST 27.5 mcg/actuation nasal spray instill ONE SPRAY IN EACH NOSTRIL EVERY DAY    fluocinonide-emollient 0.05 % Crea Apply topically 2 (two) times daily.    lisinopril (PRINIVIL,ZESTRIL) 20 MG tablet Take 1 tablet (20 mg total) by mouth 2 (two) times daily.           Clinical Reference Information           Your Vitals Were     BP Pulse Temp Resp Height Weight    163/70 70 98.2 °F (36.8 °C) 75 5' 7" (1.702 m) 63.5 kg (140 lb)    SpO2 BMI             96% 21.93 kg/m2         Allergies as of 3/4/2017        " Reactions    Antihistamines - Alkylamine Shortness Of Breath    Gabapentin Shortness Of Breath    Ultram [Tramadol] Shortness Of Breath, Palpitations, Other (See Comments)    Irritability    Anusol-hc [Hydrocortisone] Itching    Hyoscyamine Other (See Comments)    Depressed feeling    Butalbital-acetaminophen Palpitations    Zocor [Simvastatin] Nausea Only      Immunizations Administered on Date of Encounter - 3/4/2017     None      ED Micro, Lab, POCT     Start Ordered       Status Ordering Provider    03/04/17 2134 03/04/17 2133  Comprehensive metabolic panel  STAT      Final result     03/04/17 2134 03/04/17 2133  CBC auto differential  STAT      Final result     03/04/17 2134 03/04/17 2133  Brain natriuretic peptide  STAT      Final result     03/04/17 2134 03/04/17 2133  Troponin I  STAT      Final result       ED Imaging Orders     Start Ordered       Status Ordering Provider    03/04/17 2257 03/04/17 2256  X-Ray Chest 1 View  1 time imaging      In process         Discharge Instructions       Take your lisinopril twice a day.    Stop taking amlodipine until you see Dr. Hogan.    Return to the emergency department for chest pain, shortness of breath, palpitations, or other acute problems.    Your Scheduled Appointments     Jun 05, 2017 10:00 AM CDT   Established Patient - Geriatric with Jesse Hogan MD   [facility] ([facility])    [address] [place] [address]  [address] [place] [address] [id number] [phone]              Smoking Cessation     If you would like to quit smoking:   You may be eligible for free services if you are a [place] resident and started smoking cigarettes before September 1, 1988.  Call the [employer]oking [employer]essation [employer] ([employer]) toll free at ([phone] or [phone].   Call 9-293-QUIT-NOW if you do not meet the above criteria.             [facility] complies with applicable Federal civil rights laws and does not discriminate on the basis of race, color, national  origin, age, disability, or sex.        Language Assistance Services     ATTENTION: Language assistance services are available, free of charge. Please call [phone].      ATENCIÓN: Si habbridger ceja, tiene a brooks disposición servicios gratuitos de asistencia lingüística. Llame al [phone].     CHÚ Ý: N?u b?n nói Ti?ng Vi?t, có các d?ch v? h? tr? ngôn ng? mi?n phí dành cho b?n. G?i s? [phone].

## 2017-03-05 NOTE — ED TRIAGE NOTES
Pt presents to the ER for a possible medication reaction to Amlodipine. Pt has been flush, itchy all over, and weak PT started medication on 2/25

## 2017-03-05 NOTE — ED NOTES
Rounding on the patient has been done. he has been updated on the plan of care and his current status. Pain was assessed and is currently a 0/10. Pt's care taker is at the bedside. Comfort positioning and restroom needs were addressed. Necessary items were placed with in his reach. he was advised when a reassessment would take place. The call bell remains at the bedside for any additional patient needs. The patient is resting comfortably on the stretcher, respirations are even and unlabored, skin warm and dry. Will continue to monitor.

## 2017-03-05 NOTE — ED PROVIDER NOTES
Encounter Date: 3/4/2017    SCRIBE #1 NOTE: I, Ozzy Brothers , am scribing for, and in the presence of,  Dorina Bustamante MD . I have scribed the following portions of the note - Other sections scribed: HPI, ROS, PE .       History     Chief Complaint   Patient presents with    Medication Reaction      Pt thinks he is having a reaction to new medication they put him on when discharged from hospital Sunday. Started on Amlodipine. Pt reports he was seen in ER on 2/23 and was in ICU     Review of patient's allergies indicates:   Allergen Reactions    Antihistamines - alkylamine Shortness Of Breath    Gabapentin Shortness Of Breath    Ultram [tramadol] Shortness Of Breath, Palpitations and Other (See Comments)     Irritability    Anusol-hc [hydrocortisone] Itching    Hyoscyamine Other (See Comments)     Depressed feeling    Butalbital-acetaminophen Palpitations    Zocor [simvastatin] Nausea Only     HPI Comments: CC: Medication Reaction     HPI: This 76 y.o. Male c/o acute onset generalized shakiness, and itchiness to the bilateral arms (with associated posterior bilateral forearm rash), abdomen, and bilateral legs, attributed to recent medication change. Pt was discharged from the ICU on Saturday 2/25/17, around 1 week ago, after being admitted on Thursday 2/23/17 for HTN emergency (HTN + dizziness). Pt was rx'ed Amlodipine 10mg PO/day and Pantoprazole 40mg PO/day on d/c. Pt reports his symptoms alleviate slowly, but return due to having to take medication again. Pt is also c/o intermittent palpitations, generalized weakness, and light-headedness that began since being discharged from ICU. Pt was seen by his PCP, Jesse Hogan, for f/u on Monday 2/27/17, but did not tell him about his new symptoms. Pt reports he is using an over-the-counter itch cream with some relief. Pt denies troubling sleeping, shortness of breath, chest pain, or any other associated symptoms.     Pt reports he checks his BP every 3-4  days and has a normal range of 155 systolic.      PCP is Dr. Hogan.    Pt has a medical history of Anxiety; Arthritis; Bipolar depression; Cataract; Depression; Hypertension; Peripheral arterial disease; Prediabetes; and Stroke.  Remote history of tobacco use.      The history is provided by the patient. No  was used.     Past Medical History:   Diagnosis Date    Anxiety     Arthritis     Bipolar depression     Cataract     Depression     Hypertension     Peripheral arterial disease     Prediabetes     Stroke      Past Surgical History:   Procedure Laterality Date    BACK SURGERY      x 2    SHOULDER SURGERY Left     VASCULAR SURGERY Left     Left leg     Family History   Problem Relation Age of Onset    Heart failure Father      Social History   Substance Use Topics    Smoking status: Former Smoker     Packs/day: 2.00     Years: 50.00     Types: Cigarettes     Quit date: 1/25/2010    Smokeless tobacco: Never Used    Alcohol use No     Review of Systems   Constitutional: Negative for fever.        (+) generalized weakness  (+) generalized shakiness    HENT: Negative for sore throat.    Eyes: Negative for pain.   Respiratory: Negative for shortness of breath.    Cardiovascular: Positive for palpitations (intermittent). Negative for chest pain.   Gastrointestinal: Positive for abdominal pain and nausea.   Genitourinary: Negative for dysuria.   Musculoskeletal: Negative for back pain and neck stiffness.   Skin: Positive for rash (bilateral posterior arms).        (+) Itchiness to bilateral arms, abdominal, bilateral legs   Neurological: Positive for light-headedness. Negative for headaches.       Physical Exam   Initial Vitals   BP Pulse Resp Temp SpO2   03/04/17 1911 03/04/17 1911 03/04/17 1911 03/04/17 1911 03/04/17 1911   193/77 101 18 98.2 °F (36.8 °C) 99 %     Physical Exam    Nursing note and vitals reviewed.  Constitutional: He appears well-developed and well-nourished. He  is not diaphoretic. He is active. No distress.   Elderly male in NAD. Awake/alert. Speaking in complete sentences.      HENT:   Head: Normocephalic and atraumatic.   Mouth/Throat: Oropharynx is clear and moist.   Pt has no tongue or lip swelling.    Eyes: Conjunctivae and EOM are normal. Pupils are equal, round, and reactive to light.   Neck: Normal range of motion. Neck supple.   Cardiovascular: Normal rate, regular rhythm and intact distal pulses.   Murmur heard.  Pulmonary/Chest: Breath sounds normal. No respiratory distress. He has no wheezes. He has no rhonchi. He has no rales.   Abdominal: Soft. Bowel sounds are normal. He exhibits no distension. There is no tenderness.   Musculoskeletal: Normal range of motion.   LLE in compression stocking. RLE without significant edema.   Neurological: He is alert and oriented to person, place, and time. He has normal strength.   Skin: Skin is warm and dry.   Bruising bilaterally to forearms from scratching. Scattered 3mm macules to anterior abdomen, not erythematous, pt states chronic. No urticaria.   Psychiatric: He has a normal mood and affect.         ED Course   Procedures  Labs Reviewed   COMPREHENSIVE METABOLIC PANEL   CBC W/ AUTO DIFFERENTIAL   B-TYPE NATRIURETIC PEPTIDE   TROPONIN I     EKG Readings: (Independently Interpreted)   21:18: NSR, HR 73. RBBB. No STEMI. No change from 1/29/17.       X-Rays:   Independently Interpreted Readings:   Other Readings:  CXR NAD    Medical Decision Making:   History:   Old Medical Records: I decided to obtain old medical records.  Old Records Summarized: records from previous admission(s).  Initial Assessment:   76 y.o. Male with a significant HTN history presents with multiple vague complaints since discharge from hospital 1 week ago. Now c/o generalized shakiness and weakness, palpitations, light-headedness, and itching primarily to his arms but also to his legs and abdomen. Pt attributes symptoms to Amlodipine which he was  rx'ed on d/c. Hypertensive here.  Differential Diagnosis:   Ddx includes persistent HTN emergency, ACS, CHF, PNA (HCAP), other.  Independently Interpreted Test(s):   I have ordered and independently interpreted X-rays - see prior notes.  I have ordered and independently interpreted EKG Reading(s) - see prior notes  Clinical Tests:   Lab Tests: Ordered and Reviewed  The following lab test(s) were unremarkable: CBC, CMP, Troponin and BNP  Radiological Study: Ordered and Reviewed  Medical Tests: Ordered and Reviewed  ED Management:  Labs reassuring. CXR reassuring and EKG c/w priors. Borderline tachycardic and significantly hypertensive on arrival, improved spontaneously without intervention. Patient states he feels well and his exam is reassuring. I have instructed him to stop his amlodipine and increase his lisinopril 20mg from qday to BID. He is to call Dr. Hogan on Monday for further advise and follow up.             Scribe Attestation:   Scribe #1: I performed the above scribed service and the documentation accurately describes the services I performed. I attest to the accuracy of the note.    Attending Attestation:           Physician Attestation for Scribe:  Physician Attestation Statement for Scribe #1: I, Dorina Bustamante MD , reviewed documentation, as scribed by Ozzy Brothers  in my presence, and it is both accurate and complete.                 ED Course     Clinical Impression:   The primary encounter diagnosis was Non-dose-related adverse reaction to medication, initial encounter. Diagnoses of Hypertension and HTN, goal below 140/90 were also pertinent to this visit.          Dorina Bustamante MD  03/08/17 0213

## 2017-03-05 NOTE — DISCHARGE INSTRUCTIONS
Take your lisinopril twice a day.    Stop taking amlodipine until you see Dr. Hogan.    Return to the emergency department for chest pain, shortness of breath, palpitations, or other acute problems.

## 2017-03-14 ENCOUNTER — HOSPITAL ENCOUNTER (OUTPATIENT)
Facility: HOSPITAL | Age: 77
Discharge: HOME | End: 2017-03-16
Attending: EMERGENCY MEDICINE | Admitting: INTERNAL MEDICINE
Payer: MEDICARE

## 2017-03-14 DIAGNOSIS — I10 MALIGNANT HYPERTENSION: ICD-10-CM

## 2017-03-14 DIAGNOSIS — I16.0 HYPERTENSIVE URGENCY: Primary | ICD-10-CM

## 2017-03-14 LAB
ALBUMIN SERPL BCP-MCNC: 4.2 G/DL
ALP SERPL-CCNC: 40 U/L
ALT SERPL W/O P-5'-P-CCNC: 14 U/L
ANION GAP SERPL CALC-SCNC: 9 MMOL/L
AST SERPL-CCNC: 19 U/L
BASOPHILS # BLD AUTO: 0.05 K/UL
BASOPHILS NFR BLD: 0.5 %
BILIRUB SERPL-MCNC: 0.5 MG/DL
BILIRUB UR QL STRIP: NEGATIVE
BNP SERPL-MCNC: 100 PG/ML
BUN SERPL-MCNC: 9 MG/DL
CALCIUM SERPL-MCNC: 9.8 MG/DL
CHLORIDE SERPL-SCNC: 94 MMOL/L
CLARITY UR: CLEAR
CO2 SERPL-SCNC: 32 MMOL/L
COLOR UR: COLORLESS
CREAT SERPL-MCNC: 0.9 MG/DL
DIFFERENTIAL METHOD: ABNORMAL
EOSINOPHIL # BLD AUTO: 0.1 K/UL
EOSINOPHIL NFR BLD: 0.5 %
ERYTHROCYTE [DISTWIDTH] IN BLOOD BY AUTOMATED COUNT: 13.9 %
EST. GFR  (AFRICAN AMERICAN): >60 ML/MIN/1.73 M^2
EST. GFR  (NON AFRICAN AMERICAN): >60 ML/MIN/1.73 M^2
GLUCOSE SERPL-MCNC: 102 MG/DL
GLUCOSE UR QL STRIP: NEGATIVE
HCT VFR BLD AUTO: 40.3 %
HGB BLD-MCNC: 13.3 G/DL
HGB UR QL STRIP: NEGATIVE
KETONES UR QL STRIP: NEGATIVE
LEUKOCYTE ESTERASE UR QL STRIP: NEGATIVE
LYMPHOCYTES # BLD AUTO: 1 K/UL
LYMPHOCYTES NFR BLD: 10.4 %
MCH RBC QN AUTO: 29.2 PG
MCHC RBC AUTO-ENTMCNC: 33 %
MCV RBC AUTO: 88 FL
MONOCYTES # BLD AUTO: 0.6 K/UL
MONOCYTES NFR BLD: 6.5 %
NEUTROPHILS # BLD AUTO: 7.7 K/UL
NEUTROPHILS NFR BLD: 82 %
NITRITE UR QL STRIP: NEGATIVE
PH UR STRIP: 7 [PH] (ref 5–8)
PLATELET # BLD AUTO: 238 K/UL
PMV BLD AUTO: 11.3 FL
POTASSIUM SERPL-SCNC: 3.7 MMOL/L
PROT SERPL-MCNC: 7.7 G/DL
PROT UR QL STRIP: NEGATIVE
RBC # BLD AUTO: 4.56 M/UL
SODIUM SERPL-SCNC: 135 MMOL/L
SP GR UR STRIP: 1 (ref 1–1.03)
TROPONIN I SERPL DL<=0.01 NG/ML-MCNC: <0.006 NG/ML
URN SPEC COLLECT METH UR: ABNORMAL
UROBILINOGEN UR STRIP-ACNC: NEGATIVE EU/DL
WBC # BLD AUTO: 9.34 K/UL

## 2017-03-14 PROCEDURE — 96375 TX/PRO/DX INJ NEW DRUG ADDON: CPT

## 2017-03-14 PROCEDURE — 83880 ASSAY OF NATRIURETIC PEPTIDE: CPT

## 2017-03-14 PROCEDURE — 96374 THER/PROPH/DIAG INJ IV PUSH: CPT

## 2017-03-14 PROCEDURE — 63600175 PHARM REV CODE 636 W HCPCS: Performed by: EMERGENCY MEDICINE

## 2017-03-14 PROCEDURE — 25000003 PHARM REV CODE 250: Performed by: EMERGENCY MEDICINE

## 2017-03-14 PROCEDURE — 81003 URINALYSIS AUTO W/O SCOPE: CPT

## 2017-03-14 PROCEDURE — 80053 COMPREHEN METABOLIC PANEL: CPT

## 2017-03-14 PROCEDURE — 85025 COMPLETE CBC W/AUTO DIFF WBC: CPT

## 2017-03-14 PROCEDURE — 84484 ASSAY OF TROPONIN QUANT: CPT

## 2017-03-14 PROCEDURE — G0378 HOSPITAL OBSERVATION PER HR: HCPCS

## 2017-03-14 PROCEDURE — 93005 ELECTROCARDIOGRAM TRACING: CPT

## 2017-03-14 PROCEDURE — 96361 HYDRATE IV INFUSION ADD-ON: CPT

## 2017-03-14 PROCEDURE — 99291 CRITICAL CARE FIRST HOUR: CPT | Mod: 25

## 2017-03-14 RX ORDER — CLONIDINE HYDROCHLORIDE 0.1 MG/1
0.2 TABLET ORAL
Status: COMPLETED | OUTPATIENT
Start: 2017-03-14 | End: 2017-03-14

## 2017-03-14 RX ORDER — HYDRALAZINE HYDROCHLORIDE 20 MG/ML
10 INJECTION INTRAMUSCULAR; INTRAVENOUS EVERY 4 HOURS PRN
Status: DISCONTINUED | OUTPATIENT
Start: 2017-03-14 | End: 2017-03-17 | Stop reason: HOSPADM

## 2017-03-14 RX ORDER — ATORVASTATIN CALCIUM 40 MG/1
40 TABLET, FILM COATED ORAL DAILY
Status: DISCONTINUED | OUTPATIENT
Start: 2017-03-15 | End: 2017-03-17 | Stop reason: HOSPADM

## 2017-03-14 RX ORDER — CHLORTHALIDONE 25 MG/1
25 TABLET ORAL DAILY
Status: DISCONTINUED | OUTPATIENT
Start: 2017-03-15 | End: 2017-03-17 | Stop reason: HOSPADM

## 2017-03-14 RX ORDER — LISINOPRIL 20 MG/1
20 TABLET ORAL 2 TIMES DAILY
Status: DISCONTINUED | OUTPATIENT
Start: 2017-03-14 | End: 2017-03-17 | Stop reason: HOSPADM

## 2017-03-14 RX ORDER — HYDRALAZINE HYDROCHLORIDE 20 MG/ML
10 INJECTION INTRAMUSCULAR; INTRAVENOUS
Status: DISCONTINUED | OUTPATIENT
Start: 2017-03-14 | End: 2017-03-14

## 2017-03-14 RX ORDER — OLANZAPINE 2.5 MG/1
5 TABLET ORAL NIGHTLY
Status: DISCONTINUED | OUTPATIENT
Start: 2017-03-14 | End: 2017-03-17 | Stop reason: HOSPADM

## 2017-03-14 RX ORDER — NAPROXEN SODIUM 220 MG/1
81 TABLET, FILM COATED ORAL DAILY
Status: DISCONTINUED | OUTPATIENT
Start: 2017-03-15 | End: 2017-03-17 | Stop reason: HOSPADM

## 2017-03-14 RX ORDER — PANTOPRAZOLE SODIUM 40 MG/10ML
40 INJECTION, POWDER, LYOPHILIZED, FOR SOLUTION INTRAVENOUS DAILY
Status: DISCONTINUED | OUTPATIENT
Start: 2017-03-15 | End: 2017-03-17 | Stop reason: HOSPADM

## 2017-03-14 RX ORDER — ISOSORBIDE MONONITRATE 30 MG/1
30 TABLET, EXTENDED RELEASE ORAL DAILY
Status: DISCONTINUED | OUTPATIENT
Start: 2017-03-15 | End: 2017-03-17 | Stop reason: HOSPADM

## 2017-03-14 RX ORDER — LORAZEPAM 2 MG/ML
1 INJECTION INTRAMUSCULAR
Status: COMPLETED | OUTPATIENT
Start: 2017-03-14 | End: 2017-03-14

## 2017-03-14 RX ORDER — HYDRALAZINE HYDROCHLORIDE 20 MG/ML
10 INJECTION INTRAMUSCULAR; INTRAVENOUS
Status: COMPLETED | OUTPATIENT
Start: 2017-03-14 | End: 2017-03-14

## 2017-03-14 RX ORDER — CLONIDINE HYDROCHLORIDE 0.1 MG/1
0.2 TABLET ORAL 2 TIMES DAILY
Status: DISCONTINUED | OUTPATIENT
Start: 2017-03-14 | End: 2017-03-17 | Stop reason: HOSPADM

## 2017-03-14 RX ORDER — BISACODYL 10 MG
10 SUPPOSITORY, RECTAL RECTAL DAILY PRN
Status: DISCONTINUED | OUTPATIENT
Start: 2017-03-14 | End: 2017-03-17 | Stop reason: HOSPADM

## 2017-03-14 RX ORDER — FENOFIBRATE 160 MG/1
160 TABLET ORAL DAILY
Status: DISCONTINUED | OUTPATIENT
Start: 2017-03-15 | End: 2017-03-17 | Stop reason: HOSPADM

## 2017-03-14 RX ORDER — ONDANSETRON 2 MG/ML
4 INJECTION INTRAMUSCULAR; INTRAVENOUS EVERY 12 HOURS PRN
Status: DISCONTINUED | OUTPATIENT
Start: 2017-03-14 | End: 2017-03-17 | Stop reason: HOSPADM

## 2017-03-14 RX ADMIN — LORAZEPAM 1 MG: 2 INJECTION, SOLUTION INTRAMUSCULAR; INTRAVENOUS at 06:03

## 2017-03-14 RX ADMIN — LISINOPRIL 20 MG: 20 TABLET ORAL at 09:03

## 2017-03-14 RX ADMIN — CLONIDINE HYDROCHLORIDE 0.2 MG: 0.1 TABLET ORAL at 06:03

## 2017-03-14 RX ADMIN — OLANZAPINE 5 MG: 2.5 TABLET, FILM COATED ORAL at 09:03

## 2017-03-14 RX ADMIN — SODIUM CHLORIDE 1000 ML: 0.9 INJECTION, SOLUTION INTRAVENOUS at 07:03

## 2017-03-14 RX ADMIN — CLONIDINE HYDROCHLORIDE 0.2 MG: 0.1 TABLET ORAL at 09:03

## 2017-03-14 RX ADMIN — HYDRALAZINE HYDROCHLORIDE 10 MG: 20 INJECTION INTRAMUSCULAR; INTRAVENOUS at 05:03

## 2017-03-14 NOTE — ED TRIAGE NOTES
"Pt arrives to ED via personal transportation with c/o intermittent hypertension x several months. States "My primary care MD just starteded me on Cloriathiadone yesterday and told me to hold my Clonidine and when I checked my BP it was elevated." Denies chest pain, SOB, or any other sympotms   "

## 2017-03-14 NOTE — ED NOTES
Patient is aaox4, no distress noted. Spouse is at the bedside. Patient is on cardiac monitor, blood pressure cuff and pulse ox. Will continue to monitor.

## 2017-03-14 NOTE — ED PROVIDER NOTES
Encounter Date: 3/14/2017    SCRIBE #1 NOTE: I, Cecilia Andersen, am scribing for, and in the presence of,  Margy Sheffield MD. I have scribed the following portions of the note - Other sections scribed: ROS, HPI.       History     Chief Complaint   Patient presents with    Hypertension     states his blood pressure monitor reading 225/72 after taking his clonidine 1mg at 10:30am denies symptoms other than a little anxious states b/p has been running high x 3 days.States was started on new bp med ,told it would take one week to work     Review of patient's allergies indicates:   Allergen Reactions    Amlodipine Itching     Pt has itching, rash, pain in feet, weakness, body shacking, and inability to walk     Antihistamines - alkylamine Shortness Of Breath    Gabapentin Shortness Of Breath    Ultram [tramadol] Shortness Of Breath, Palpitations and Other (See Comments)     Irritability    Anusol-hc [hydrocortisone] Itching    Hyoscyamine Other (See Comments)     Depressed feeling    Butalbital-acetaminophen Palpitations    Zocor [simvastatin] Nausea Only     HPI Comments: CC: Hypertension    HPI: Patient is a 76 y.o. M with a past medical history of Anxiety; Arthritis; Bipolar depression; Cataract; Depression; Hypertension; Peripheral arterial disease; Prediabetes; and Stroke who presents to the ED for evaluation of acute onset hypertension x3 days. He states his blood pressure this morning was 225/72 after taking Clonidine at 10:30 am. He is not in any pain. He states he has been taking Clonidine and Lisinopril as directed with no improvement of symptoms. No modifying factors. Patient denies chest pain, shortness of breath, headache, visual disturbance, leg swelling, and/or difficulty breathing. He adds he has not been checking his BP regularly since it has been running high as of late.  He saw his primary care doctor yesterday, with adjustment of his medicines, however he states he was told that it may  take a week for this medication to be effective.  He is concerned that his blood pressure has been persistently elevated despite taking medicines prescribed.      The history is provided by the patient. No  was used.     Past Medical History:   Diagnosis Date    Anxiety     Arthritis     Bipolar depression     Cataract     Depression     Hypertension     Peripheral arterial disease     Prediabetes     Stroke      Past Surgical History:   Procedure Laterality Date    BACK SURGERY      x 2    SHOULDER SURGERY Left     VASCULAR SURGERY Left     Left leg     Family History   Problem Relation Age of Onset    Heart failure Father      Social History   Substance Use Topics    Smoking status: Former Smoker     Packs/day: 2.00     Years: 50.00     Types: Cigarettes     Quit date: 1/25/2010    Smokeless tobacco: Never Used    Alcohol use No     Review of Systems   Constitutional: Negative for chills, diaphoresis and fever.   HENT: Negative for sore throat.    Eyes: Negative for visual disturbance.   Respiratory: Negative for cough and shortness of breath.    Cardiovascular: Negative for chest pain.   Gastrointestinal: Negative for abdominal pain, diarrhea, nausea and vomiting.   Genitourinary: Negative for dysuria.   Musculoskeletal: Negative for back pain.   Skin: Negative for rash.   Neurological: Negative for headaches.       Physical Exam   Initial Vitals   BP Pulse Resp Temp SpO2   03/14/17 1255 03/14/17 1255 03/14/17 1255 03/14/17 1255 03/14/17 1255   185/73 69 18 98.3 °F (36.8 °C) 96 %     Physical Exam    Nursing note and vitals reviewed.  Constitutional: He appears well-developed and well-nourished. No distress.   HENT:   Head: Normocephalic and atraumatic.   Eyes: Conjunctivae and EOM are normal. Pupils are equal, round, and reactive to light.   Neck: Normal range of motion. Neck supple.   Cardiovascular: Normal rate, regular rhythm and normal heart sounds. Exam reveals no gallop  and no friction rub.    No murmur heard.  Pulmonary/Chest: Effort normal and breath sounds normal. No stridor. He has no wheezes. He has no rhonchi. He has no rales.   Abdominal: Soft. Normal appearance and bowel sounds are normal. He exhibits no distension and no pulsatile midline mass. There is no tenderness. There is no rebound and no guarding.   Musculoskeletal: Normal range of motion. He exhibits edema. He exhibits no tenderness.   (+)chronic LLE edema present.  No calf tenderness.  No long bone deformity.  Peripheral pulses intact.   Neurological: He is alert and oriented to person, place, and time. He has normal strength. No cranial nerve deficit or sensory deficit.   Skin: Skin is warm and dry.   Psychiatric: He has a normal mood and affect. His behavior is normal. Thought content normal.         ED Course   Critical Care  Date/Time: 3/14/2017 7:43 PM  Performed by: LUCINDA GOODWIN  Authorized by: LUCINDA GOODWIN   Direct patient critical care time: 20 minutes  Additional history critical care time: 5 (wife) minutes  Ordering / reviewing critical care time: 5 minutes  Documentation critical care time: 10 minutes  Consulting other physicians critical care time: 5 minutes  Total critical care time (exclusive of procedural time) : 45 minutes        Labs Reviewed   CBC W/ AUTO DIFFERENTIAL - Abnormal; Notable for the following:        Result Value    RBC 4.56 (*)     Hemoglobin 13.3 (*)     Gran% 82.0 (*)     Lymph% 10.4 (*)     All other components within normal limits   COMPREHENSIVE METABOLIC PANEL - Abnormal; Notable for the following:     Sodium 135 (*)     Chloride 94 (*)     CO2 32 (*)     Alkaline Phosphatase 40 (*)     All other components within normal limits   B-TYPE NATRIURETIC PEPTIDE - Abnormal; Notable for the following:      (*)     All other components within normal limits   URINALYSIS - Abnormal; Notable for the following:     Color, UA Colorless (*)     All other  components within normal limits   TROPONIN I     EKG Readings: (Independently Interpreted)   Normal sinus rhythm, rate approximately 66.  Right bundle branch block present.  QTc 423.  No evidence of acute ischemia or malignant arrhythmia.          Medical Decision Making:   History:   Old Medical Records: I decided to obtain old medical records.  Old Records Summarized: records from clinic visits.       <> Summary of Records: Recent clinic visit with PCP Dr. Hogan.  Started on lisinopril 20 mg twice a day.  Clonidine 0.1 mg twice a day.  Imdur daily.   76 y.o. male with history of stroke, hypertension, presents to the emergency department for persistently elevated blood pressure.  He presented to his primary care doctor's office yesterday, with adjustment of his medicines.  However he and the patient's wife are concerned that his blood pressure is remaining elevated.  Upon arrival to emergency department, blood pressure 180s over 70s.  End organ workup initiated based on patient's reported blood pressure 200s systolic.  Patient remained asymptomatic.  He denied chest pain, shortness of breath, headache, vision changes, new extremity edema, or new focal weakness.  Labs reveal no leukocytosis, hemoglobin stable.  Creatinine within normal limits.  Troponin less than 0.006.  , not consistent with CHF.  Urinalysis is negative for protein, or evidence of infection.  Chest x-ray independently interpreted by me shows no evidence of acute cardiopulmonary process.  EKG independently interpreted by me negative for acute ischemia.  I decided to discuss this case with his primary care doctor, Dr. Hogan.  Due to the patient's history of CVA, he wants to try to treat the patient's blood pressure.  He states that if he cannot get the blood pressure less than 160 systolic, he wants to admit the patient for observation.  Patient given IV hydralazine, his home dose of oral clonidine, in ED. I do believe there is an  anxiety component to his symptoms, IV Ativan ordered. Blood pressure remained greater than 160 systolic even after multiple meds given in ED.   Therefore admit orders placed on behalf of Dr. Hogan. Dr. Hogan requests cardiology consult. He is followed by [facility] cardiology group.  He wants to increase his dose of clonidine to 0.2mg BID and I will order prn Hydralazine. Patient and wife informed of admission and agree.    7:53 PM after clonidine and Ativan given, repeat blood pressure 120s systolic.  This is a significant drop from initial blood pressure at triage.  He remains asymptomatic.  IV fluids ordered.  I will keep admit orders in place, to continue to closely monitor his blood pressure and adjust as needed, as I suspect this will be a transient decrease in blood pressure.             Scribe Attestation:   Scribe #1: I performed the above scribed service and the documentation accurately describes the services I performed. I attest to the accuracy of the note.    Attending Attestation:           Physician Attestation for Scribe:  Physician Attestation Statement for Scribe #1: I, Margy Sheffield MD, reviewed documentation, as scribed by Cecilia Andersen in my presence, and it is both accurate and complete.                 ED Course     Clinical Impression:   The primary encounter diagnosis was Hypertensive urgency. A diagnosis of Malignant hypertension was also pertinent to this visit.          Margy Sheffield MD  03/14/17 1955

## 2017-03-14 NOTE — IP AVS SNAPSHOT
[place] [address] [place] [address]  Phone: [phone]           Patient Discharge Instructions     Our goal is to set you up for success. This packet includes information on your condition, medications, and your home care. It will help you to care for yourself so you don't get sicker and need to go back to the hospital.     Please ask your nurse if you have any questions.        There are many details to remember when preparing to leave the hospital. Here is what you will need to do:    1. Take your medicine. If you are prescribed medications, review your Medication List in the following pages. You may have new medications to  at the pharmacy and others that you'll need to stop taking. Review the instructions for how and when to take your medications. Talk with your doctor or nurses if you are unsure of what to do.     2. Go to your follow-up appointments. Specific follow-up information is listed in the following pages. Your may be contacted by a transition nurse or clinical provider about future appointments. Be sure we have all of the phone numbers to reach you, if needed. Please contact your provider's office if you are unable to make an appointment.     3. Watch for warning signs. Your doctor or nurse will give you detailed warning signs to watch for and when to call for assistance. These instructions may also include educational information about your condition. If you experience any of warning signs to your health, call your doctor.               ** Verify the list of medication(s) below is accurate and up to date. Carry this with you in case of emergency. If your medications have changed, please notify your healthcare provider.             Medication List      START taking these medications        Additional Info                      cloNIDine 0.2 mg/24 hr td ptwk 0.2 mg/24 hr   Commonly known as:  CATAPRES   Quantity:  4 patch   Refills:  11   Dose:  1 patch     Instructions:  Place 1 patch onto the skin every 7 days.     Begin Date    AM    Noon    PM    Bedtime         CONTINUE taking these medications        Additional Info                      acetaminophen-codeine 300-30mg 300-30 mg Tab   Commonly known as:  TYLENOL #3   Refills:  0    Instructions:  Take by mouth every 6 (six) hours as needed.     Begin Date    AM    Noon    PM    Bedtime       aspirin 81 MG Chew   Refills:  0   Dose:  81 mg    Last time this was given:  81 mg on 3/16/2017  8:14 AM   Instructions:  Take 81 mg by mouth once daily.     Begin Date    AM    Noon    PM    Bedtime       atorvastatin 40 MG tablet   Commonly known as:  LIPITOR   Quantity:  90 tablet   Refills:  3   Dose:  40 mg    Last time this was given:  40 mg on 3/16/2017  8:13 AM   Instructions:  Take 1 tablet (40 mg total) by mouth once daily.     Begin Date    AM    Noon    PM    Bedtime       chlorthalidone 25 MG Tab   Commonly known as:  HYGROTEN   Quantity:  30 tablet   Refills:  3   Dose:  25 mg    Last time this was given:  25 mg on 3/16/2017  8:13 AM   Instructions:  Take 1 tablet (25 mg total) by mouth once daily.     Begin Date    AM    Noon    PM    Bedtime       desonide 0.05 % cream   Commonly known as:  DESOWEN   Refills:  0    Instructions:  Apply topically 2 (two) times daily.     Begin Date    AM    Noon    PM    Bedtime       fenofibrate 160 MG Tab   Quantity:  90 tablet   Refills:  1   Dose:  160 mg    Last time this was given:  160 mg on 3/16/2017  8:13 AM   Instructions:  Take 1 tablet (160 mg total) by mouth once daily.     Begin Date    AM    Noon    PM    Bedtime       fluocinonide-emollient 0.05 % Crea   Refills:  0    Instructions:  Apply topically 2 (two) times daily.     Begin Date    AM    Noon    PM    Bedtime       fluticasone 50 mcg/actuation nasal spray   Commonly known as:  FLONASE   Quantity:  1 Bottle   Refills:  6   Dose:  1 spray    Last time this was given:  2 sprays on 3/16/2017  8:20 AM    Instructions:  1 spray by Each Nare route once daily.     Begin Date    AM    Noon    PM    Bedtime       isosorbide mononitrate 30 MG 24 hr tablet   Commonly known as:  IMDUR   Quantity:  30 tablet   Refills:  11   Dose:  30 mg    Last time this was given:  30 mg on 3/16/2017  8:13 AM   Instructions:  Take 1 tablet (30 mg total) by mouth once daily.     Begin Date    AM    Noon    PM    Bedtime       lactulose 10 gram/15 mL solution   Commonly known as:  CHRONULAC   Refills:  0      Begin Date    AM    Noon    PM    Bedtime       lisinopril 20 MG tablet   Commonly known as:  PRINIVIL,ZESTRIL   Quantity:  60 tablet   Refills:  3   Dose:  20 mg   Comments:  PT STATES HE TAKES 2 TABS QD......    Last time this was given:  20 mg on 3/16/2017  9:20 PM   Instructions:  Take 1 tablet (20 mg total) by mouth 2 (two) times daily.     Begin Date    AM    Noon    PM    Bedtime       metformin 500 MG tablet   Commonly known as:  GLUCOPHAGE   Quantity:  100 tablet   Refills:  3   Dose:  500 mg    Instructions:  Take 1 tablet (500 mg total) by mouth once daily.     Begin Date    AM    Noon    PM    Bedtime       olanzapine 15 MG Tab   Commonly known as:  ZyPREXA   Refills:  0   Dose:  5 mg    Last time this was given:  5 mg on 3/15/2017  8:49 PM   Instructions:  Take 5 mg by mouth nightly.     Begin Date    AM    Noon    PM    Bedtime       pantoprazole 40 MG tablet   Commonly known as:  PROTONIX   Quantity:  30 tablet   Refills:  11   Dose:  40 mg    Instructions:  Take 1 tablet (40 mg total) by mouth once daily.     Begin Date    AM    Noon    PM    Bedtime         STOP taking these medications     cloNIDine 0.1 MG tablet   Commonly known as:  CATAPRES            Where to Get Your Medications      These medications were sent to [facility] - [place] - [address]  [address] [place] [address]     Phone:  [phone]     cloNIDine 0.2 mg/24 hr td ptwk 0.2 mg/24 hr                   Please bring to all follow up appointments:    1. A copy of your discharge instructions.  2. All medicines you are currently taking in their original bottles.  3. Identification and insurance card.    Please arrive 15 minutes ahead of scheduled appointment time.    Please call 24 hours in advance if you must reschedule your appointment and/or time.        Your Scheduled Appointments     Mar 20, 2017 12:00 PM CDT   Established Patient - Internal Medicine with Jesse Hogan MD   [facility] ([facility])    [address] [place] [id number]   [phone]            Jun 05, 2017 10:00 AM CDT   Established Patient - Geriatric with Jesse Hogan MD   [facility] ([facility])    [address] [place] [id number]   [phone]              Follow-up Information     Follow up with Jesse Hogan MD On 3/20/2017.    Specialties:  Internal Medicine, Oncology, Hematology and Oncology    Why:  12:00  p.m. on Monday, March 20, 2017 see Dr. Hogan for your hospital followup visit.    Contact information:    [address] [place] [address] [address]  [facility] [address]  [phone]          Follow up with Adam Pop MD. Go on 3/27/2017.    Specialty:  Cardiovascular Disease    Why:  For Appointment on Monday 3/27/2017 @ 10:40AM.     Contact information:    [address] [place] [address]  [facility]  [facility] [address]  [phone]            Primary Diagnosis     Your primary diagnosis was:  Severe Uncontrolled High Blood Pressure      Admission Information     Date & Time Provider Department [facility]    3/14/2017  1:38 PM Jesse Hogan MD [facility] [id number]      Care Providers     Provider Role Specialty Primary office phone    Jesse Hogan MD Attending Provider Internal Medicine [phone]    Sergio Keller MD Consulting Physician  Cardiology [phone]      Your Vitals Were     BP Pulse Temp Resp Height Weight    143/62 (BP Location: Left arm, Patient  "Position: Sitting, BP Method: Automatic) 72 96.9 °F (36.1 °C) (Oral) 18 5' 8" (1.727 m) 64.2 kg (141 lb 8.6 oz)    SpO2 BMI             97% 21.52 kg/m2         Recent Lab Values        1/25/2016 10/15/2016                        2:56 PM  2:31 AM          A1C 5.9 (A) 6.0          Comment for A1C at  2:31 AM on 10/15/2016:  According to ADA guidelines, hemoglobin A1C <7.0% represents  optimal control in non-pregnant diabetic patients.  Different  metrics may apply to specific populations.   Standards of Medical Care in Diabetes - 2016.  For the purpose of screening for the presence of diabetes:  <5.7%     Consistent with the absence of diabetes  5.7-6.4%  Consistent with increasing risk for diabetes   (prediabetes)  >or=6.5%  Consistent with diabetes  Currently no consensus exists for use of hemoglobin A1C  for diagnosis of diabetes for children.        Pending Labs     Order Current Status    Aldosterone In process    Aldosterone/Renin Activity Ratio In process    Renin In process      Allergies as of 3/16/2017        Reactions    Amlodipine Itching    Pt has itching, rash, pain in feet, weakness, body shacking, and inability to walk     Antihistamines - Alkylamine Shortness Of Breath    Gabapentin Shortness Of Breath    Ultram [Tramadol] Shortness Of Breath, Palpitations, Other (See Comments)    Irritability    Anusol-hc [Hydrocortisone] Itching    Hyoscyamine Other (See Comments)    Depressed feeling    Butalbital-acetaminophen Palpitations    Zocor [Simvastatin] Nausea Only      [facility]     [facility] Care Line - 24/7 Assistance  Unless otherwise directed by your provider, please contact [facility], our nurse care line that is available for 24/7 assistance.     Registered nurses in the [facility] provide clinical advisement, health education, appointment booking, and other advisory services.  Call for this free service at [phone].        Advance Directives     An advance " directive is a document which, in the event you are no longer able to make decisions for yourself, tells your healthcare team what kind of treatment you do or do not want to receive, or who you would like to make those decisions for you.  If you do not currently have an advance directive, [facility] encourages you to create one.  For more information call:  [phone] ([phone]), [phone] ([phone]),  or log on to [email or web address].        Language Assistance Services     ATTENTION: Language assistance services are available, free of charge. Please call [phone].      ATENCIÓN: Si habla español, tiene a brooks disposición servicios gratuitos de asistencia lingüística. Llame al [phone].     CHÚ Ý: N?u b?n nói Ti?ng Vi?t, có các d?ch v? h? tr? ngôn ng? mi?n phí dành cho b?n. G?i s? [phone].        Stroke Education               [facility] complies with applicable Federal civil rights laws and does not discriminate on the basis of race, color, national origin, age, disability, or sex.

## 2017-03-15 LAB
ALBUMIN SERPL BCP-MCNC: 3.5 G/DL
ALP SERPL-CCNC: 40 U/L
ALT SERPL W/O P-5'-P-CCNC: 12 U/L
ANION GAP SERPL CALC-SCNC: 7 MMOL/L
AST SERPL-CCNC: 14 U/L
BASOPHILS # BLD AUTO: 0.05 K/UL
BASOPHILS NFR BLD: 0.7 %
BILIRUB SERPL-MCNC: 0.5 MG/DL
BUN SERPL-MCNC: 7 MG/DL
CALCIUM SERPL-MCNC: 9.7 MG/DL
CHLORIDE SERPL-SCNC: 97 MMOL/L
CO2 SERPL-SCNC: 31 MMOL/L
CREAT SERPL-MCNC: 0.9 MG/DL
DIFFERENTIAL METHOD: ABNORMAL
EOSINOPHIL # BLD AUTO: 0.2 K/UL
EOSINOPHIL NFR BLD: 3 %
ERYTHROCYTE [DISTWIDTH] IN BLOOD BY AUTOMATED COUNT: 14 %
EST. GFR  (AFRICAN AMERICAN): >60 ML/MIN/1.73 M^2
EST. GFR  (NON AFRICAN AMERICAN): >60 ML/MIN/1.73 M^2
GLUCOSE SERPL-MCNC: 96 MG/DL
HCT VFR BLD AUTO: 37 %
HGB BLD-MCNC: 11.9 G/DL
LYMPHOCYTES # BLD AUTO: 1.5 K/UL
LYMPHOCYTES NFR BLD: 21.8 %
MCH RBC QN AUTO: 28.6 PG
MCHC RBC AUTO-ENTMCNC: 32.2 %
MCV RBC AUTO: 89 FL
MONOCYTES # BLD AUTO: 0.8 K/UL
MONOCYTES NFR BLD: 12.2 %
NEUTROPHILS # BLD AUTO: 4.2 K/UL
NEUTROPHILS NFR BLD: 62.2 %
PLATELET # BLD AUTO: 240 K/UL
PMV BLD AUTO: 11.9 FL
POTASSIUM SERPL-SCNC: 3.6 MMOL/L
PROT SERPL-MCNC: 6.5 G/DL
RBC # BLD AUTO: 4.16 M/UL
SODIUM SERPL-SCNC: 135 MMOL/L
WBC # BLD AUTO: 6.73 K/UL

## 2017-03-15 PROCEDURE — C9113 INJ PANTOPRAZOLE SODIUM, VIA: HCPCS | Performed by: EMERGENCY MEDICINE

## 2017-03-15 PROCEDURE — 85025 COMPLETE CBC W/AUTO DIFF WBC: CPT

## 2017-03-15 PROCEDURE — 80053 COMPREHEN METABOLIC PANEL: CPT

## 2017-03-15 PROCEDURE — 36415 COLL VENOUS BLD VENIPUNCTURE: CPT

## 2017-03-15 PROCEDURE — 63700000 PHARM REV CODE 250 ALT 637 W/O HCPCS: Performed by: EMERGENCY MEDICINE

## 2017-03-15 PROCEDURE — 25000003 PHARM REV CODE 250: Performed by: EMERGENCY MEDICINE

## 2017-03-15 PROCEDURE — 63600175 PHARM REV CODE 636 W HCPCS: Performed by: EMERGENCY MEDICINE

## 2017-03-15 PROCEDURE — G0378 HOSPITAL OBSERVATION PER HR: HCPCS

## 2017-03-15 RX ORDER — FLUTICASONE PROPIONATE 50 MCG
2 SPRAY, SUSPENSION (ML) NASAL DAILY
Status: DISCONTINUED | OUTPATIENT
Start: 2017-03-16 | End: 2017-03-17 | Stop reason: HOSPADM

## 2017-03-15 RX ADMIN — LISINOPRIL 20 MG: 20 TABLET ORAL at 08:03

## 2017-03-15 RX ADMIN — OLANZAPINE 5 MG: 2.5 TABLET, FILM COATED ORAL at 08:03

## 2017-03-15 RX ADMIN — CLONIDINE HYDROCHLORIDE 0.2 MG: 0.1 TABLET ORAL at 08:03

## 2017-03-15 RX ADMIN — ISOSORBIDE MONONITRATE 30 MG: 30 TABLET, EXTENDED RELEASE ORAL at 09:03

## 2017-03-15 RX ADMIN — PANTOPRAZOLE SODIUM 40 MG: 40 INJECTION, POWDER, FOR SOLUTION INTRAVENOUS at 09:03

## 2017-03-15 RX ADMIN — CHLORTHALIDONE 25 MG: 25 TABLET ORAL at 09:03

## 2017-03-15 RX ADMIN — ASPIRIN 81 MG 81 MG: 81 TABLET ORAL at 09:03

## 2017-03-15 RX ADMIN — FENOFIBRATE 160 MG: 160 TABLET ORAL at 09:03

## 2017-03-15 RX ADMIN — LISINOPRIL 20 MG: 20 TABLET ORAL at 09:03

## 2017-03-15 RX ADMIN — ATORVASTATIN CALCIUM 40 MG: 40 TABLET, FILM COATED ORAL at 09:03

## 2017-03-15 RX ADMIN — CLONIDINE HYDROCHLORIDE 0.2 MG: 0.1 TABLET ORAL at 09:03

## 2017-03-15 NOTE — H&P
[facility]  History & Physical    SUBJECTIVE:     Chief Complaint/Reason for Admission: Hypertensive Emergency     History of Present Illness:    Mr. Garcia is a pleasant 75 y/o gentleman with PAD and uncontrolled HTN who had an ischemic CVA in October 2016. He presented to the ER c/o uncontrolled BP at home. He has been feeling poorly but no dizziness this time.  Denies cp, sob or palpitations. BP meds were changed recently. In the ED, he was started on higher dose of clonidine with hydralzine IV. During the last admission he underwent renal artery US.  Denies HA but has blurred vision. + Severe GERD- not seen GI yet      PTA Medications   Medication Sig    acetaminophen-codeine 300-30mg (TYLENOL #3) 300-30 mg Tab Take by mouth every 6 (six) hours as needed.    aspirin 81 MG Chew Take 81 mg by mouth once daily.    atorvastatin (LIPITOR) 40 MG tablet Take 1 tablet (40 mg total) by mouth once daily.    chlorthalidone (HYGROTEN) 25 MG Tab Take 1 tablet (25 mg total) by mouth once daily.    cloNIDine (CATAPRES) 0.1 MG tablet Take 0.1 mg by mouth 2 (two) times daily.    desonide (DESOWEN) 0.05 % cream Apply topically 2 (two) times daily.    fenofibrate 160 MG Tab Take 1 tablet (160 mg total) by mouth once daily.    fluocinonide-emollient 0.05 % Crea Apply topically 2 (two) times daily.    isosorbide mononitrate (IMDUR) 30 MG 24 hr tablet Take 1 tablet (30 mg total) by mouth once daily.    lactulose (CHRONULAC) 10 gram/15 mL solution     lisinopril (PRINIVIL,ZESTRIL) 20 MG tablet Take 1 tablet (20 mg total) by mouth 2 (two) times daily.    metformin (GLUCOPHAGE) 500 MG tablet Take 1 tablet (500 mg total) by mouth once daily.    olanzapine (ZYPREXA) 15 MG Tab Take 5 mg by mouth nightly.    fluticasone (FLONASE) 50 mcg/actuation nasal spray 1 spray by Each Nare route once daily.    pantoprazole (PROTONIX) 40 MG tablet Take 1 tablet (40 mg total) by mouth once daily.       Review of  patient's allergies indicates:   Allergen Reactions    Antihistamines - alkylamine Shortness Of Breath    Gabapentin Shortness Of Breath    Ultram [tramadol] Shortness Of Breath, Palpitations and Other (See Comments)     Irritability    Anusol-hc [hydrocortisone] Itching    Hyoscyamine Other (See Comments)     Depressed feeling    Butalbital-acetaminophen Palpitations    Zocor [simvastatin] Nausea Only       Past Medical History:   Diagnosis Date    Anxiety     Arthritis     Bipolar depression     Cataract     Depression     Hypertension     Peripheral arterial disease     Prediabetes     Stroke      Past Surgical History:   Procedure Laterality Date    BACK SURGERY      x 2    SHOULDER SURGERY Left     VASCULAR SURGERY Left     Left leg     Family History   Problem Relation Age of Onset    Heart failure Father      Social History   Substance Use Topics    Smoking status: Former Smoker     Packs/day: 2.00     Years: 50.00     Types: Cigarettes     Quit date: 1/25/2010    Smokeless tobacco: Never Used    Alcohol use No      Review of Systems   Constitutional: Denies any weigh or appetite changes. Denies fever or chills  Eyes: see HPI.   ENT: no nasal congestion. Denies sore throat with odynophagia   Respiratory: Denies  SOB, exertional dyspnea or hemoptysis   Cardiovascular: no chest pain or palpitations   Gastrointestinal: no nausea, vomiting or abdominal pain. Normal bowel habits   Hematologic/Lymphatic: No lymphadenopathy or night sweats. No mucocutaneous bleeding   Musculoskeletal: No ROM abnormalities or muscle weakness   Neurological: no seizures or tremors, paresthesias of BUE resolved. No facial numbness/tingling or slurred speech.    Skin: No rashes or lesions  Psych: Denies any anxiety, depression or insomnia    OBJECTIVE:     Vital Signs (Most Recent):  Temp: 97.9 °F (36.6 °C) (03/15/17 0500)  Pulse: 60 (03/15/17 0500)  Resp: 16 (03/15/17 0500)  BP: 134/74 (03/15/17 0500)  SpO2: 97 %  (03/15/17 0500)    Physical Exam:    General: NAD, resting comfortably in bed. Wife at the bedside. RN at the door   Head: normocephalic, atraumatic. No facial droop    Eyes: conjunctivae pink, anicteric sclera. PERRL. EOM normal.   Throat: No erythema or post nasal discharge   Neck: Supple, no LAD   Lungs: CTAB   Heart: S1, S2, RRR   Abdomen: Active BS x 4 quadrants. + Epigastric region tenderness   Extremities: no cyanosis or edema.   Skin: turgor normal, no erythema or rashes.   MS: Moves all extremities equally.    Neuro: a&o X 3  Psy: pleasant, affect is congruent to mood     Laboratory:  CBC:     Recent Labs  Lab 03/15/17  0438   WBC 6.73   RBC 4.16*   HGB 11.9*   HCT 37.0*      MCV 89   MCH 28.6   MCHC 32.2   CMP:     Recent Labs  Lab 03/14/17  1409      CALCIUM 9.8   ALBUMIN 4.2   PROT 7.7   *   K 3.7   CO2 32*   CL 94*   BUN 9   CREATININE 0.9   ALKPHOS 40*   ALT 14   AST 19   BILITOT 0.5     Cardiac markers:     Recent Labs  Lab 03/14/17  1409   TROPONINI <0.006     Microbiology Results (last 7 days)     ** No results found for the last 168 hours. **          Recent Labs  Lab 03/14/17  1354   COLORU Colorless*   SPECGRAV 1.005   PHUR 7.0   PROTEINUA Negative   NITRITE Negative   LEUKOCYTESUR Negative   UROBILINOGEN Negative     CT head w/o contrast: 02/23/2017    Mild/moderate right inferior frontal and anterior temporal encephalomalacia unchanged most compatible with prior contusion.  Superimposed Age-appropriate generalized cerebral volume loss with mild degree of patchy decreased attenuation supratentorial white matter suggestive for chronic microvascular ischemic change similarly seen on the prior.     No evidence for acute intracranial hemorrhage or definite new abnormal parenchymal attenuation. Clinical correlation and further evaluation as warranted.    Current Facility-Administered Medications   Medication Dose Route Frequency Provider Last Rate Last Dose    aspirin chewable  tablet 81 mg  81 mg Oral Daily Margy Sheffield MD   81 mg at 03/15/17 0959    atorvastatin tablet 40 mg  40 mg Oral Daily Margy Sheffield MD   40 mg at 03/15/17 0959    bisacodyl suppository 10 mg  10 mg Rectal Daily PRN Margy Sheffield MD        chlorthalidone tablet 25 mg  25 mg Oral Daily Margy Sheffield MD   25 mg at 03/15/17 0959    cloNIDine tablet 0.2 mg  0.2 mg Oral BID Margy Sheffield MD   0.2 mg at 03/15/17 0959    fenofibrate tablet 160 mg  160 mg Oral Daily Margy Sheffield MD   160 mg at 03/15/17 0958    hydrALAZINE injection 10 mg  10 mg Intravenous Q4H PRN Margy Sheffield MD        isosorbide mononitrate 24 hr tablet 30 mg  30 mg Oral Daily Margy Sheffield MD   30 mg at 03/15/17 0959    lisinopril tablet 20 mg  20 mg Oral BID Margy Sheffield MD   20 mg at 03/15/17 0959    olanzapine tablet 5 mg  5 mg Oral Nightly Margy Sheffield MD   5 mg at 03/14/17 2157    ondansetron injection 4 mg  4 mg Intravenous Q12H PRN Margy Sheffield MD        pantoprazole injection 40 mg  40 mg Intravenous Daily Margy Sheffield MD   40 mg at 03/15/17 0958         ASSESSMENT/PLAN:     Active Hospital Problems    Diagnosis  POA    Hypertensive urgency [I16.0]  Yes      Resolved Hospital Problems    Diagnosis Date Resolved POA   No resolved problems to display.     1. Hypertensive Emergency -   -  Cards consulted  - US renal artery during last visit shows ? stenosis   - pt on Chlorthalidone   - increased dose of Clonidine and bp improving     2. Hx of ischemic CVA - no residual deficits   - Continue statin and ASA  - Need BP control - adjusted medication regimen     3. GERD: sever with hx of dysplastic changes to GE junction mucosa    - continue PPI   - f/u with GI outpatient     4. Anemia of Chronic Dis: stable     5. Hyponatremia: mild   - most likely diuretic effect    6. DVT Prophylaxis: TEDs    Ok to start diet        Jesse Hogan M.D  Internal Medicine & Geriatric Medicine  Hematology & Oncology  Palliative Medicine    [address], [address]  [place], [place] [id number] [phone] (Office)  [phone] (Fax)

## 2017-03-15 NOTE — PLAN OF CARE
03/15/17 1100   Discharge Assessment   Assessment Type Discharge Planning Assessment   Confirmed/corrected address and phone number on facesheet? Yes   Assessment information obtained from? Patient   Expected Length of Stay (days) 1   Communicated expected length of stay with patient/caregiver yes   Type of Healthcare Directive Received (n/a)   If Healthcare Directive is received, is it scanned into Epic? (n/a)   Prior to hospitilization cognitive status: Alert/Oriented   Prior to hospitalization functional status: Independent   Current cognitive status: Alert/Oriented   Current Functional Status: Independent   Arrived From home or self-care   Lives With alone   Able to Return to Prior Arrangements yes   Is patient able to care for self after discharge? Yes   How many people do you have in your home that can help with your care after discharge? 1   Who are your caregiver(s) and their phone number(s)? Terence Hui, Friend, [phone]   Patient's perception of discharge disposition home or selfcare   Readmission Within The Last 30 Days other (see comments)  (Returned to hospital due to HTN.  Not in inpatient status at this time.)   Patient currently being followed by outpatient case management? No   Patient currently receives home health services? No   Does the patient currently use HME? No   Patient currently receives private duty nursing? No   Patient currently receives any other outside agency services? No   Equipment Currently Used at Home none   Do you have any problems affording any of your prescribed medications? No   Is the patient taking medications as prescribed? yes   Do you have any financial concerns preventing you from receiving the healthcare you need? No   Does the patient have transportation to healthcare appointments? Yes   Transportation Available family or friend will provide;car   On Dialysis? No   Does the patient receive services at the Coumadin Clinic? No   Are there any open cases? No    Discharge Plan A Home   Discharge Plan B Home   Patient/Family In Agreement With Plan yes   Patient from home and indpendent.  Has a friend, Carmen, who helps him at home if needed.  She checks on him 3-4 times per week.  She also helps him with transportation to and from his doctor appts if needed.  Patient's preferred PCP is Dr. Hogan.  Patient would prefer morning appointments but will accept any day or  time that's available.  Discharge plan:  home with self care.  Allie Gayle, MELODY, ACM-SW, CCM

## 2017-03-15 NOTE — PLAN OF CARE
Problem: Fall Risk (Adult)  Goal: Absence of Falls  Patient will demonstrate the desired outcomes by discharge/transition of care.   Outcome: Ongoing (interventions implemented as appropriate)  Patient remains free of falls, call bell within reach, bed alarm on, spouse at bedside.    Problem: Hypertensive Disease/Crisis (Arterial) (Adult)  Goal: Signs and Symptoms of Listed Potential Problems Will be Absent, Minimized or Managed (Hypertensive Disease/Crisis)  Signs and symptoms of listed potential problems will be absent, minimized or managed by discharge/transition of care (reference Hypertensive Disease/Crisis (Arterial) (Adult) CPG).   Outcome: Ongoing (interventions implemented as appropriate)  Patient covered with BP medication as ordered.  NSR on tele monitor.

## 2017-03-15 NOTE — PROGRESS NOTES
Pt post discharge f/u with Dr. Hogan scheduled for 3/20/2017 at 12:00 p.m.  Information placed on AVS.  SW will continue to f/u to determine if patient will need f/u with Cards.  Allie Gayle LMSW, ACM-SW, CCM

## 2017-03-15 NOTE — PROGRESS NOTES
Patient admitted to room 308B from ER.  Denies pain and SOB at this time. NSR on tele monitor, BP still elevated covered with evening BP medication.  Oriented to unit and unit routine.  Assessment done per flow sheets.

## 2017-03-15 NOTE — ED NOTES
Upon return from Xray patient is shaky and appears nervous. Patient reports he does get nervous often, when thinking about his blood pressure. MD aware of patients symptoms.

## 2017-03-15 NOTE — CONSULTS
76 yom, hx of severe uncontrolled HTN, hx of CVA few months ago treated with tpa at [facility]. No coronary or cardiac hx. Nl EF 10/16. Hx of PAD left iliac stent. Prior smoker.     He has chronic severe HTN on clinic measurements. He takes lisinopril,clonidine 0.1 tid and Imdur (although no CAD?). He had several presentations with severe HTN and orthostatic hypotension. Last hospital admission 2/23/17: --->dropped to 100 with orthostasis, with no significant rise in pulse. They stopped diltiazem. Chlorthalidone was added 2 days ago.  Dizziness did not recur, but BP charly to 220 yesterday, with no symptoms.   No CP, no dyspnea.     Review of Systems   Constitutional: Negative for appetite change, chills and fever.   HENT: Negative for congestion, ear pain, rhinorrhea and sore throat.   Eyes: Negative for pain.   Respiratory: Negative for cough and shortness of breath.   Cardiovascular: Negative for chest pain, palpitations and leg swelling.   Gastrointestinal: Negative for abdominal pain, constipation, diarrhea, nausea and vomiting.   Genitourinary: Negative for difficulty urinating, dysuria, frequency, hematuria and urgency.   Musculoskeletal: Negative for back pain and neck pain.   Skin: Negative for rash.      On exam:  Vitals as above  Head: Normocephalic and atraumatic.   Right Ear: External ear normal.   Left Ear: External ear normal.   Eyes: EOM are normal. Pupils are equal, round, and reactive to light.   Neck: Trachea normal. Neck supple.   Cardiovascular: Normal rate, regular rhythm and normal heart sounds. Exam reveals no gallop and no friction rub.   No murmur heard.  Pulmonary/Chest: Breath sounds normal. No respiratory distress. He has no wheezes. He has no rhonchi. He has no rales.   Abdominal: Soft. Normal appearance and bowel sounds are normal. He exhibits no distension. There is no tenderness.   Musculoskeletal: Normal range of motion. He exhibits no edema.   Neurological: He is alert and  oriented to person, place, and time. He has normal strength. No cranial nerve deficit or sensory deficit.   Skin: Skin is warm, dry and intact. No rash noted.   Psychiatric: He has a normal mood and affect. His speech is not slurred      Impression:  Severe HTN, prior severe orthostatic hypotension. No dizziness this presentation    -HTN got controlled with increasing clonidine to 0.2 and with continuation of chlorthalidone. He has not been getting dizzy despite raising clonidine (probably because diltiazem off)  I am ok with this regimen of chlorthalidone and clonidine 0.2 bid, however he needs to be monitored today for dizziness and he will need F/U BMP in few days to 1 week, to ensure no hypok or kidney injury with chlorthalidone

## 2017-03-16 VITALS
WEIGHT: 141.56 LBS | HEIGHT: 68 IN | RESPIRATION RATE: 20 BRPM | BODY MASS INDEX: 21.45 KG/M2 | SYSTOLIC BLOOD PRESSURE: 143 MMHG | DIASTOLIC BLOOD PRESSURE: 62 MMHG | HEART RATE: 72 BPM | TEMPERATURE: 98 F | OXYGEN SATURATION: 98 %

## 2017-03-16 LAB
ALBUMIN SERPL BCP-MCNC: 3.4 G/DL
ALP SERPL-CCNC: 35 U/L
ALT SERPL W/O P-5'-P-CCNC: 10 U/L
ANION GAP SERPL CALC-SCNC: 10 MMOL/L
AST SERPL-CCNC: 13 U/L
BASOPHILS # BLD AUTO: 0.06 K/UL
BASOPHILS NFR BLD: 0.9 %
BILIRUB SERPL-MCNC: 0.4 MG/DL
BUN SERPL-MCNC: 9 MG/DL
CALCIUM SERPL-MCNC: 9.2 MG/DL
CHLORIDE SERPL-SCNC: 95 MMOL/L
CO2 SERPL-SCNC: 29 MMOL/L
CREAT SERPL-MCNC: 1 MG/DL
DIFFERENTIAL METHOD: ABNORMAL
EOSINOPHIL # BLD AUTO: 0.2 K/UL
EOSINOPHIL NFR BLD: 3.6 %
ERYTHROCYTE [DISTWIDTH] IN BLOOD BY AUTOMATED COUNT: 14.1 %
EST. GFR  (AFRICAN AMERICAN): >60 ML/MIN/1.73 M^2
EST. GFR  (NON AFRICAN AMERICAN): >60 ML/MIN/1.73 M^2
GLUCOSE SERPL-MCNC: 100 MG/DL
HCT VFR BLD AUTO: 34.7 %
HGB BLD-MCNC: 11.6 G/DL
LYMPHOCYTES # BLD AUTO: 1.3 K/UL
LYMPHOCYTES NFR BLD: 19.7 %
MCH RBC QN AUTO: 29.3 PG
MCHC RBC AUTO-ENTMCNC: 33.4 %
MCV RBC AUTO: 88 FL
MONOCYTES # BLD AUTO: 0.8 K/UL
MONOCYTES NFR BLD: 12.4 %
NEUTROPHILS # BLD AUTO: 4.3 K/UL
NEUTROPHILS NFR BLD: 63.3 %
PLATELET # BLD AUTO: 227 K/UL
PMV BLD AUTO: 11.8 FL
POTASSIUM SERPL-SCNC: 3.7 MMOL/L
PROT SERPL-MCNC: 6.2 G/DL
RBC # BLD AUTO: 3.96 M/UL
SODIUM SERPL-SCNC: 134 MMOL/L
WBC # BLD AUTO: 6.71 K/UL

## 2017-03-16 PROCEDURE — C9113 INJ PANTOPRAZOLE SODIUM, VIA: HCPCS | Performed by: EMERGENCY MEDICINE

## 2017-03-16 PROCEDURE — 84244 ASSAY OF RENIN: CPT | Mod: 91

## 2017-03-16 PROCEDURE — 82088 ASSAY OF ALDOSTERONE: CPT

## 2017-03-16 PROCEDURE — 36415 COLL VENOUS BLD VENIPUNCTURE: CPT

## 2017-03-16 PROCEDURE — 80053 COMPREHEN METABOLIC PANEL: CPT

## 2017-03-16 PROCEDURE — 63700000 PHARM REV CODE 250 ALT 637 W/O HCPCS: Performed by: EMERGENCY MEDICINE

## 2017-03-16 PROCEDURE — 85025 COMPLETE CBC W/AUTO DIFF WBC: CPT

## 2017-03-16 PROCEDURE — 25000003 PHARM REV CODE 250: Performed by: EMERGENCY MEDICINE

## 2017-03-16 PROCEDURE — 25000003 PHARM REV CODE 250

## 2017-03-16 PROCEDURE — 63600175 PHARM REV CODE 636 W HCPCS: Performed by: EMERGENCY MEDICINE

## 2017-03-16 PROCEDURE — G0378 HOSPITAL OBSERVATION PER HR: HCPCS

## 2017-03-16 PROCEDURE — 82088 ASSAY OF ALDOSTERONE: CPT | Mod: 91

## 2017-03-16 RX ORDER — CLONIDINE HYDROCHLORIDE 0.1 MG/1
0.2 TABLET ORAL ONCE
Status: COMPLETED | OUTPATIENT
Start: 2017-03-16 | End: 2017-03-16

## 2017-03-16 RX ORDER — CLONIDINE 0.2 MG/24H
1 PATCH, EXTENDED RELEASE TRANSDERMAL
Qty: 4 PATCH | Refills: 11 | Status: SHIPPED | OUTPATIENT
Start: 2017-03-16 | End: 2019-05-02

## 2017-03-16 RX ORDER — CLONIDINE 0.2 MG/24H
1 PATCH, EXTENDED RELEASE TRANSDERMAL
Status: DISCONTINUED | OUTPATIENT
Start: 2017-03-17 | End: 2017-03-17 | Stop reason: HOSPADM

## 2017-03-16 RX ADMIN — ATORVASTATIN CALCIUM 40 MG: 40 TABLET, FILM COATED ORAL at 08:03

## 2017-03-16 RX ADMIN — ISOSORBIDE MONONITRATE 30 MG: 30 TABLET, EXTENDED RELEASE ORAL at 08:03

## 2017-03-16 RX ADMIN — PANTOPRAZOLE SODIUM 40 MG: 40 INJECTION, POWDER, FOR SOLUTION INTRAVENOUS at 08:03

## 2017-03-16 RX ADMIN — CLONIDINE HYDROCHLORIDE 0.2 MG: 0.1 TABLET ORAL at 07:03

## 2017-03-16 RX ADMIN — LISINOPRIL 20 MG: 20 TABLET ORAL at 09:03

## 2017-03-16 RX ADMIN — FENOFIBRATE 160 MG: 160 TABLET ORAL at 08:03

## 2017-03-16 RX ADMIN — LISINOPRIL 20 MG: 20 TABLET ORAL at 08:03

## 2017-03-16 RX ADMIN — CHLORTHALIDONE 25 MG: 25 TABLET ORAL at 08:03

## 2017-03-16 RX ADMIN — HYDRALAZINE HYDROCHLORIDE 10 MG: 20 INJECTION INTRAMUSCULAR; INTRAVENOUS at 05:03

## 2017-03-16 RX ADMIN — CLONIDINE HYDROCHLORIDE 0.2 MG: 0.1 TABLET ORAL at 08:03

## 2017-03-16 RX ADMIN — FLUTICASONE PROPIONATE 2 SPRAY: 50 SPRAY, METERED NASAL at 08:03

## 2017-03-16 RX ADMIN — ASPIRIN 81 MG 81 MG: 81 TABLET ORAL at 08:03

## 2017-03-16 NOTE — PROGRESS NOTES
Progress Note    Admit Date: 3/14/2017   LOS: 0 days     SUBJECTIVE:     Follow-up For:  Hypertensive urgency    Interval History    03/16/2017: States feeling little better but BP high again this AM- had blurred vision     Scheduled Meds:   aspirin  81 mg Oral Daily    atorvastatin  40 mg Oral Daily    chlorthalidone  25 mg Oral Daily    cloNIDine  0.2 mg Oral BID    fenofibrate  160 mg Oral Daily    fluticasone  2 spray Each Nare Daily    isosorbide mononitrate  30 mg Oral Daily    lisinopril  20 mg Oral BID    olanzapine  5 mg Oral Nightly    pantoprazole  40 mg Intravenous Daily     Continuous Infusions:   PRN Meds:bisacodyl, hydrALAZINE, ondansetron    Review of patient's allergies indicates:   Allergen Reactions    Amlodipine Itching     Pt has itching, rash, pain in feet, weakness, body shacking, and inability to walk     Antihistamines - alkylamine Shortness Of Breath    Gabapentin Shortness Of Breath    Ultram [tramadol] Shortness Of Breath, Palpitations and Other (See Comments)     Irritability    Anusol-hc [hydrocortisone] Itching    Hyoscyamine Other (See Comments)     Depressed feeling    Butalbital-acetaminophen Palpitations    Zocor [simvastatin] Nausea Only       Review of Systems    Constitutional: Denies any weigh or appetite changes. Denies fever or chills  Eyes: see HPI.   ENT: no nasal congestion. Denies sore throat with odynophagia   Respiratory: Denies  SOB, exertional dyspnea or hemoptysis   Cardiovascular: no chest pain or palpitations   Gastrointestinal: no nausea, vomiting or abdominal pain. Normal bowel habits   Hematologic/Lymphatic: No lymphadenopathy or night sweats. No mucocutaneous bleeding   Musculoskeletal: No ROM abnormalities or muscle weakness   Neurological: no seizures or tremors, paresthesias of BUE resolved. No facial numbness/tingling or slurred speech.   Skin: No rashes or lesions  Psych: Denies any anxiety, depression or insomnia      OBJECTIVE:      Vital Signs (Most Recent)  Temp: 98.1 °F (36.7 °C) (03/16/17 0400)  Pulse: (!) 58 (03/16/17 0400)  Resp: 20 (03/16/17 0400)  BP: 130/62 (03/16/17 0400)  SpO2: 96 % (03/16/17 0400)    Vital Signs Range (Last 24H):  Temp:  [96.2 °F (35.7 °C)-98.1 °F (36.7 °C)]   Pulse:  [53-66]   Resp:  [17-20]   BP: (117-214)/(53-80)   SpO2:  [96 %-100 %]     I & O (Last 24H):  Intake/Output Summary (Last 24 hours) at 03/16/17 0731  Last data filed at 03/15/17 1730   Gross per 24 hour   Intake              840 ml   Output                0 ml   Net              840 ml     Physical Exam:  General: NAD, sitting on the edge of the bed, having breakfast. Wife at the bedside.   Head: normocephalic, atraumatic. No facial droop   Eyes: conjunctivae pink, anicteric sclera. PERRL. EOM normal.   Throat: No erythema or post nasal discharge   Neck: Supple, no LAD   Lungs: CTAB   Heart: S1, S2, RRR   Abdomen: Active BS x 4 quadrants. + Epigastric region tenderness   Extremities: no cyanosis or edema.   Skin: turgor normal, no erythema or rashes.   MS: Moves all extremities equally.   Neuro: a&o X 3  Psy: pleasant, affect is congruent to mood        Laboratory:  CBC:   Recent Labs  Lab 03/16/17  0530   WBC 6.71   RBC 3.96*   HGB 11.6*   HCT 34.7*      MCV 88   MCH 29.3   MCHC 33.4     CMP:   Recent Labs  Lab 03/16/17  0530      CALCIUM 9.2   ALBUMIN 3.4*   PROT 6.2   *   K 3.7   CO2 29   CL 95   BUN 9   CREATININE 1.0   ALKPHOS 35*   ALT 10   AST 13   BILITOT 0.4     Coagulation: No results for input(s): INR, APTT in the last 168 hours.    Invalid input(s): PT  Cardiac markers:   Recent Labs  Lab 03/14/17  1409   TROPONINI <0.006     Microbiology Results (last 7 days)     ** No results found for the last 168 hours. **          Recent Labs  Lab 03/14/17  1354   COLORU Colorless*   SPECGRAV 1.005   PHUR 7.0   PROTEINUA Negative   NITRITE Negative   LEUKOCYTESUR Negative   UROBILINOGEN Negative       Diagnostic  Results:  Reviewed     ASSESSMENT/PLAN:     1. Hypertensive urgency/Emergency -   - Cards consulted and appreciate input   - US renal artery during last visit shows ? stenosis   - pt on Chlorthalidone   - increased dose of Clonidine to 0.2mg and bp improving but with intermittent spikes  - check Renin and Aldosterone   - monitor for the next 6 hours and get few more readings before dc home  - will start Clonidine patch-pt know SEs including drowsiness and postural hypotension       2. Hx of ischemic CVA - no residual deficits   - Continue statin and ASA    3. GERD: sever with hx of dysplastic changes to GE junction mucosa   - continue PPI  - f/u with GI outpatient      4. Anemia of Chronic Dis: stable      5. Hyponatremia: mild  - most likely diuretic effect  - electrolytes stable      6. DVT Prophylaxis: TEDs    Hopefully dc home soon    Jesse Hogan M.D  Internal Medicine & Geriatric Medicine  Hematology & Oncology  Palliative Medicine    [address], [address]  [place], [place] [id number] [phone] (Office)  [phone] (Fax)

## 2017-03-16 NOTE — PROGRESS NOTES
WRITTEN HEALTHCARE DISCHARGE INFORMATION         If you are unable to make your follow up appointments, please call the number listed and reschedule this appointment.     After discharge, if you need assistance, you can call [facility] On Call Nurse [facility] Line for 24/7 assistance at [phone]    Thank you for choosing [facility] and allowing us to care for you.   From your care management team: Indigo Farmer [phone] or [phone]     You should receive a call from [facility] Discharge Department within 48-72 hours to help manage your care after discharge. Please try to make sure that you answer your phone for this important phone call.     Follow-up Information     Follow up with Jesse Hoagn MD On 3/20/2017.    Specialties:  Internal Medicine, Oncology, Hematology and Oncology    Why:  12:00  p.m. on Monday, March 20, 2017 see Dr. Hogan for your hospital followup visit.    Contact information:    [address]  [address]  [place] [address]  [phone]          Follow up with Adam Pop MD. Go on 3/27/2017.    Specialty:  Cardiovascular Disease    Why:  For Appointment on Monday 3/27/2017 @ 10:40AM.     Contact information:    [address]  [address]  [facility]  [place] [address]  [phone]

## 2017-03-16 NOTE — DISCHARGE SUMMARY
[facility]  Discharge Summary      Admit Date: 3/14/2017    Discharge Date and Time: 03/16/2017  @ 5pm    Attending Physician: Jesse Hogan MD     Reason for Admission: Malignant hypertension [I10]  Hypertensive urgency [I16.0]      Procedures Performed: * No surgery found *    Hospital Course (synopsis of major diagnoses, care, treatment, and services provided during the course of the hospital stay):     Mr. Garcia is a pleasant 75 y/o gentleman with PAD and uncontrolled HTN who had an ischemic CVA in October 2016. He presented to the ER c/o uncontrolled BP at home. He has been feeling poorly but no dizziness this time. Denies cp, sob or palpitations. BP meds were changed recently. In the ED, he was started on higher dose of clonidine with hydralzine IV. During the last admission he underwent renal artery US. Denies HA but has blurred vision. + Severe GERD- not seen GI yet      During this hospitalization, these following conditions were addressed and managed along with other comorbid conditions:     1. Hypertensive urgency/Emergency -   - Cards consulted and appreciate input   - US renal artery during last visit shows ? stenosis   - pt on Chlorthalidone   - increased dose of Clonidine to 0.2mg and bp improving but with intermittent spikes  - check Renin and Aldosterone   - monitor for the next 6 hours and get few more readings before dc home  - will start Clonidine patch-pt know SEs including drowsiness and postural hypotension       2. Hx of ischemic CVA - no residual deficits   - Continue statin and ASA    3. GERD: sever with hx of dysplastic changes to GE junction mucosa   - continue PPI  - f/u with GI outpatient      4. Anemia of Chronic Dis: stable      5. Hyponatremia: mild  - most likely diuretic effect  - electrolytes stable      6. DVT Prophylaxis: TEDs    Hopefully dc home soon    Consults: cardiology    Significant Diagnostic Studies: see above     Final Diagnoses:    Principal  Problem: Hypertensive urgency   Secondary Diagnoses:   Active Hospital Problems    Diagnosis  POA    *Hypertensive urgency [I16.0]  Yes      Resolved Hospital Problems    Diagnosis Date Resolved POA   No resolved problems to display.       Discharged Condition: stable    Disposition: Home or Self Care     Activity: as tolerated    Diet: Low salt, cardiac     Follow Up/Patient Instructions:     Medications:  Reconciled Home Medications:   Current Discharge Medication List      START taking these medications    Details   cloNIDine 0.2 mg/24 hr td ptwk (CATAPRES) 0.2 mg/24 hr Place 1 patch onto the skin every 7 days.  Qty: 4 patch, Refills: 11    Associated Diagnoses: Hypertensive urgency         CONTINUE these medications which have NOT CHANGED    Details   acetaminophen-codeine 300-30mg (TYLENOL #3) 300-30 mg Tab Take by mouth every 6 (six) hours as needed.      aspirin 81 MG Chew Take 81 mg by mouth once daily.      atorvastatin (LIPITOR) 40 MG tablet Take 1 tablet (40 mg total) by mouth once daily.  Qty: 90 tablet, Refills: 3      chlorthalidone (HYGROTEN) 25 MG Tab Take 1 tablet (25 mg total) by mouth once daily.  Qty: 30 tablet, Refills: 3    Associated Diagnoses: HTN, goal below 140/90      desonide (DESOWEN) 0.05 % cream Apply topically 2 (two) times daily.      fenofibrate 160 MG Tab Take 1 tablet (160 mg total) by mouth once daily.  Qty: 90 tablet, Refills: 1    Associated Diagnoses: Hypertriglyceridemia      fluocinonide-emollient 0.05 % Crea Apply topically 2 (two) times daily.      isosorbide mononitrate (IMDUR) 30 MG 24 hr tablet Take 1 tablet (30 mg total) by mouth once daily.  Qty: 30 tablet, Refills: 11    Associated Diagnoses: Hypertensive urgency      lactulose (CHRONULAC) 10 gram/15 mL solution       lisinopril (PRINIVIL,ZESTRIL) 20 MG tablet Take 1 tablet (20 mg total) by mouth 2 (two) times daily.  Qty: 60 tablet, Refills: 3    Comments: PT STATES HE TAKES 2 TABS QD......  Associated Diagnoses:  HTN, goal below 140/90      metformin (GLUCOPHAGE) 500 MG tablet Take 1 tablet (500 mg total) by mouth once daily.  Qty: 100 tablet, Refills: 3    Associated Diagnoses: Impaired fasting blood sugar      olanzapine (ZYPREXA) 15 MG Tab Take 5 mg by mouth nightly.      fluticasone (FLONASE) 50 mcg/actuation nasal spray 1 spray by Each Nare route once daily.  Qty: 1 Bottle, Refills: 6    Associated Diagnoses: Nasal congestion      pantoprazole (PROTONIX) 40 MG tablet Take 1 tablet (40 mg total) by mouth once daily.  Qty: 30 tablet, Refills: 11         STOP taking these medications       cloNIDine (CATAPRES) 0.1 MG tablet Comments:   Reason for Stopping:             No discharge procedures on file.  Follow-up Information     Follow up with Jesse Hogan MD On 3/20/2017.    Specialties:  Internal Medicine, Oncology, Hematology and Oncology    Why:  12:00  p.m. on Monday, March 20, 2017 see Dr. Hogan for your hospital followup visit.    Contact information:    [address] [address] [phone]          Follow up with Adam Pop MD. Go on 3/27/2017.    Specialty:  Cardiovascular Disease    Why:  For Appointment on Monday 3/27/2017 @ 10:40AM.     Contact information:    [address] [place] [address] [id number] [phone]            Jesse Hogan M.D  Internal Medicine & Geriatric Medicine  Hematology & Oncology  Palliative Medicine    [address] [place], [place] [address], [facility], [address] [id number] [phone] (Office)  [phone] (Fax)

## 2017-03-16 NOTE — PLAN OF CARE
Problem: Hypertensive Disease/Crisis (Arterial) (Adult)  Intervention: Cluster Activity/Decrease Environmental Stimulation    03/15/17 2019   Cognitive Interventions   Sensory Stimulation Regulation care clustered;quiet environment promoted       Intervention: Promote Rest/Minimize Oxygen Consumption    03/15/17 2019   Activity   Activity Type up ad ree   Coping/Psychosocial Interventions   Environmental Support calm environment promoted       Intervention: Support/Optimize Psychosocial Response to Illness    03/15/17 2019   Coping/Psychosocial Interventions   Supportive Measures active listening utilized;relaxation techniques promoted       Intervention: Gradually Decrease Blood Pressure    03/15/17 2019   Safety Interventions   Medication Review/Management medications reviewed;high risk medications identified         Goal: Signs and Symptoms of Listed Potential Problems Will be Absent, Minimized or Managed (Hypertensive Disease/Crisis)  Signs and symptoms of listed potential problems will be absent, minimized or managed by discharge/transition of care (reference Hypertensive Disease/Crisis (Arterial) (Adult) CPG).   Outcome: Ongoing (interventions implemented as appropriate)    03/15/17 2019   Hypertensive Disease/Crisis (Arterial)   Problems Assessed (Hypertensive Disease/Crisis (Arterial)) all   Problems Present (Hypertensive Disease/Crisis (Arterial)) none

## 2017-03-16 NOTE — PROGRESS NOTES
Follow up appt scheduled with Radha at Dr. Pop's office [phone], for Monday 3/27/2017 @ 10:40AM

## 2017-03-16 NOTE — PROGRESS NOTES
TN reviewed follow up appointment information and was informed of [facility]. Patient is in agreement and verbalized an understanding. Placed discharge information in blue discharge folder.  Patient was reminded to call the number written on the dry erase board for any oather questions, concerns, or needs.    TN also gave patient document to record AM/PM blood pressures. TN instructed pt to record these BP's on document and bring to MD appts.    Dr. Viramontesese aware that BMP must be followed up on pt within 1 week and that he will order from the office.

## 2017-03-16 NOTE — PLAN OF CARE
03/16/17 1437   Final Note   Assessment Type Final Discharge Note   Discharge Disposition Home  (with spouse)   Discharge planning education complete? Yes   What phone number can be called within the next 1-3 days to see how you are doing after discharge? [phone]   Hospital Follow Up  Appt(s) scheduled? Yes   Discharge plans and expectations educations in teach back method with documentation complete? Yes   Offered [facility] -- Bedside Delivery? n/a   Discharge/Hospital Encounter Summary to ([facility]) PCP No   Referral to Outpatient Case Management complete? No   Referral to / orders for Home Health Complete? No   30 day supply of medicines given at discharge, if documented non-compliance / non-adherence? No   Any social issues identified prior to discharge? No   Did you assess the readiness or willingness of the family or caregiver to support self management of care? Yes

## 2017-03-16 NOTE — PLAN OF CARE
Problem: Patient Care Overview  Goal: Plan of Care Review  Outcome: Ongoing (interventions implemented as appropriate)  Possible discharge home on today. Manuel

## 2017-03-17 NOTE — NURSING
Pt BP came down into parameters for him to be discharged. Wife at bedside. Went over all medication to continue and new medication to be picked up at pharmacy in the morning. Informed pt of up coming doctor appointments. Pt and wife verbalized understanding. No pain and no signs of distress. Charge nurse Anup Davies RN wheeling pt in wheel chair. All belongings with pt. Telemetry monitor and IV removed.

## 2017-03-19 LAB — RENIN PLAS-CCNC: 2.2 NG/ML/H

## 2017-03-20 LAB — ALDOST SERPL-MCNC: 3.5 NG/DL

## 2017-03-21 LAB
ALDOST SERPL-MCNC: 3.4 NG/DL
ALDOST/RENIN PLAS-RTO: 6.8 RATIO
RENIN PLAS-CCNC: 0.5 NG/ML/HR

## 2017-09-08 NOTE — UM SECONDARY REVIEW
"Physician Advisor Internal    Other OBSERVATION F/U <48HRS  NOT ABLE TO CERTIFY FOR CONTINUED OBSERVATION.  H&P PENDING AT TIME OF REVIEW. STILL PENDING APPROVAL FOR OBSERVATION FROM INSURANCE.  77 Y/O M WITH HX OF ANXIETY, ARTHRITIS, BIPOLAR DEPRESSION, CATARACT, HTN, PAD, CVA PRESENTED FOR EVAL OF ACUTE ONSET HTN x3 DAYS. BP ON 3/14 AM /72 AFTER TAKING CLONIDINE. PT SAW PCP ON 3/13 WITH ADJUSTMENT OF MEDICATIONS, HOWEVER HE WAS TOLD IT MAY TAKE A WEEK BEFORE IT BECOMES EFFECTIVE.  NOTED CARDIOLOGY CONSULT PRESENT IN AGREEMENT WITH CURRENT REGIMEN, AND REC: " he needs to be monitored today for dizziness and he will need F/U BMP in few days to 1 week, to ensure no hypok or kidney injury with chlorthalidone".      ATTENDING IS DR. ALVARES, H&P PENDING AT TIME OF REVIEW    Approved Observation  "

## 2017-10-05 PROBLEM — I10 HTN, GOAL BELOW 140/90: Status: ACTIVE | Noted: 2017-10-05

## 2017-10-05 PROBLEM — Z23 FLU VACCINE NEED: Status: ACTIVE | Noted: 2017-10-05

## 2017-10-05 PROBLEM — J34.89 RHINORRHEA: Status: ACTIVE | Noted: 2017-10-05

## 2017-10-05 PROBLEM — L29.9 PRURITUS: Status: ACTIVE | Noted: 2017-10-05

## 2017-10-05 PROBLEM — R09.81 NASAL CONGESTION: Status: ACTIVE | Noted: 2017-10-05

## 2017-10-05 PROBLEM — E78.5 HYPERLIPIDEMIA LDL GOAL <100: Status: ACTIVE | Noted: 2017-10-05

## 2017-10-11 PROBLEM — M79.671 PAIN IN BOTH FEET: Status: ACTIVE | Noted: 2017-10-11

## 2017-10-11 PROBLEM — M79.672 PAIN IN BOTH FEET: Status: ACTIVE | Noted: 2017-10-11

## 2017-11-06 ENCOUNTER — HOSPITAL ENCOUNTER (EMERGENCY)
Facility: OTHER | Age: 77
Discharge: HOME | End: 2017-11-06
Attending: EMERGENCY MEDICINE
Payer: MEDICARE

## 2017-11-06 VITALS
TEMPERATURE: 98 F | RESPIRATION RATE: 18 BRPM | HEART RATE: 64 BPM | SYSTOLIC BLOOD PRESSURE: 104 MMHG | OXYGEN SATURATION: 98 % | DIASTOLIC BLOOD PRESSURE: 64 MMHG | BODY MASS INDEX: 22.05 KG/M2 | WEIGHT: 145 LBS

## 2017-11-06 DIAGNOSIS — M54.9 CHRONIC MIDLINE BACK PAIN, UNSPECIFIED BACK LOCATION: Primary | ICD-10-CM

## 2017-11-06 DIAGNOSIS — G89.29 CHRONIC MIDLINE BACK PAIN, UNSPECIFIED BACK LOCATION: Primary | ICD-10-CM

## 2017-11-06 PROCEDURE — 99283 EMERGENCY DEPT VISIT LOW MDM: CPT

## 2017-11-06 RX ORDER — PREDNISONE 10 MG/1
10 TABLET ORAL DAILY
Qty: 5 TABLET | Refills: 0 | Status: SHIPPED | OUTPATIENT
Start: 2017-11-06 | End: 2017-11-11

## 2017-11-06 RX ORDER — METHOCARBAMOL 500 MG/1
500 TABLET, FILM COATED ORAL 3 TIMES DAILY
Qty: 15 TABLET | Refills: 0 | Status: SHIPPED | OUTPATIENT
Start: 2017-11-06 | End: 2017-11-11

## 2017-11-06 NOTE — ED PROVIDER NOTES
Encounter Date: 11/6/2017       History     Chief Complaint   Patient presents with    Back Pain     The patient has chronic back pain for which he usually takes Tylenol with Codeine.  However he took a Lortab today and that seemed to work better so he is requesting Lortab for his back pain.  This is a chronic recurrent condition      The history is provided by the patient.   Back Pain    This is a chronic problem. The current episode started two days ago. The problem occurs constantly. The problem has been unchanged. The pain is associated with no known injury. The pain is present in the lumbar spine. The quality of the pain is described as aching. The pain does not radiate. The pain is at a severity of 4/10. The symptoms are aggravated by bending, twisting and certain positions. The pain is worse during the day. Stiffness is present in the morning. Pertinent negatives include no numbness and no weakness. Treatments tried: Tylenol with Codeine. The treatment provided mild relief. Risk factors include lack of exercise, poor posture, a sedentary lifestyle and a history of osteoporosis.     Review of patient's allergies indicates:   Allergen Reactions    Amlodipine Itching     Pt has itching, rash, pain in feet, weakness, body shacking, and inability to walk     Antihistamines - alkylamine Shortness Of Breath    Gabapentin Shortness Of Breath    Ultram [tramadol] Shortness Of Breath, Palpitations and Other (See Comments)     Irritability    Anusol-hc [hydrocortisone] Itching    Hyoscyamine Other (See Comments)     Depressed feeling    Butalbital-acetaminophen Palpitations    Movantik [naloxegol] Nausea Only and Rash    Zocor [simvastatin] Nausea Only     Past Medical History:   Diagnosis Date    Anxiety     Arthritis     Bipolar depression     Cataract     Depression     Hypertension     Peripheral arterial disease     Prediabetes     Stroke      Past Surgical History:   Procedure Laterality Date     BACK SURGERY      x 2    SHOULDER SURGERY Left     VASCULAR SURGERY Left     Left leg     Family History   Problem Relation Age of Onset    Heart failure Father      Social History   Substance Use Topics    Smoking status: Former Smoker     Packs/day: 2.00     Years: 50.00     Types: Cigarettes     Quit date: 1/25/2010    Smokeless tobacco: Never Used    Alcohol use No     Review of Systems   Constitutional: Negative.    HENT: Negative.    Eyes: Negative.    Respiratory: Negative.    Cardiovascular: Negative.    Gastrointestinal: Negative.    Endocrine: Negative.    Genitourinary: Negative.    Musculoskeletal: Positive for back pain. Negative for gait problem.   Skin: Negative.    Allergic/Immunologic: Negative.    Neurological: Negative.  Negative for weakness and numbness.        No bowel or bladder dysfunction   Hematological: Negative.    Psychiatric/Behavioral: Negative.    All other systems reviewed and are negative.      Physical Exam     Initial Vitals [11/06/17 1305]   BP Pulse Resp Temp SpO2   (!) 111/39 63 18 97.5 °F (36.4 °C) 97 %      MAP       63         Physical Exam    Nursing note and vitals reviewed.  Constitutional: Vital signs are normal. He appears cachectic. He is active and cooperative.   HENT:   Head: Normocephalic and atraumatic.   Eyes: Conjunctivae, EOM and lids are normal. Pupils are equal, round, and reactive to light.   Neck: Trachea normal and full passive range of motion without pain. Neck supple. No thyroid mass present.   Cardiovascular: Normal rate, regular rhythm, S1 normal, S2 normal, normal heart sounds, intact distal pulses and normal pulses.   Abdominal: Soft. Normal appearance, normal aorta and bowel sounds are normal.   Musculoskeletal: Normal range of motion.        Lumbar back: He exhibits pain.        Back:    Lymphadenopathy:     He has no axillary adenopathy.   Neurological: He is alert and oriented to person, place, and time. He has normal strength. No  cranial nerve deficit or sensory deficit. He displays a negative Romberg sign. GCS eye subscore is 4. GCS verbal subscore is 5. GCS motor subscore is 6.   Skin: Skin is warm, dry and intact.   Psychiatric: He has a normal mood and affect. His speech is normal and behavior is normal. Judgment and thought content normal. Cognition and memory are normal.         ED Course   Procedures  Labs Reviewed - No data to display                            ED Course      Clinical Impression:   The encounter diagnosis was Chronic midline back pain, unspecified back location.                           Vishnu Ziegler MD  11/06/17 1754

## 2017-11-06 NOTE — ED TRIAGE NOTES
Patient reports having lower back pain X2 days, patient denies injury.  Patient has a history of chronic back pain

## 2017-11-24 ENCOUNTER — HOSPITAL ENCOUNTER (EMERGENCY)
Facility: OTHER | Age: 77
Discharge: HOME | End: 2017-11-24
Attending: INTERNAL MEDICINE
Payer: MEDICARE

## 2017-11-24 VITALS
DIASTOLIC BLOOD PRESSURE: 64 MMHG | BODY MASS INDEX: 21.52 KG/M2 | SYSTOLIC BLOOD PRESSURE: 176 MMHG | RESPIRATION RATE: 16 BRPM | OXYGEN SATURATION: 97 % | HEIGHT: 68 IN | WEIGHT: 142 LBS | HEART RATE: 87 BPM | TEMPERATURE: 98 F

## 2017-11-24 DIAGNOSIS — L50.9 URTICARIA: Primary | ICD-10-CM

## 2017-11-24 LAB — POCT GLUCOSE: 111 MG/DL (ref 70–110)

## 2017-11-24 PROCEDURE — 99283 EMERGENCY DEPT VISIT LOW MDM: CPT | Mod: 25

## 2017-11-24 PROCEDURE — 82947 ASSAY GLUCOSE BLOOD QUANT: CPT

## 2017-11-24 RX ORDER — PREDNISONE 20 MG/1
20 TABLET ORAL DAILY
Qty: 5 TABLET | Refills: 0 | Status: SHIPPED | OUTPATIENT
Start: 2017-11-24 | End: 2017-11-29

## 2017-11-24 NOTE — ED PROVIDER NOTES
Encounter Date: 11/24/2017       History     Chief Complaint   Patient presents with    Itching     reports that he was seen here a few weeks ago for itching and would like to get a new presciption for itching, pt was issued prednisone previously     77-year-old male presents to the emergency department complaining of itching in the bilateral upper arms and right flank times several days.  He states this rash has occurred previously and he was given prednisone, which resolved it.  He has seen a dermatologist who prescribed a steroid cream, but patient states the cream was only mildly effective.      The history is provided by the patient. No  was used.   Rash    This is a recurrent problem. The current episode started several days ago. The problem has been gradually improving. The problem is associated with an unknown factor. The rash is present on the left arm and right arm. The pain is at a severity of 0/10. Associated symptoms include itching. Pertinent negatives include no blisters, no pain and no weeping. He has tried steriods for the symptoms. The treatment provided mild relief.     Review of patient's allergies indicates:   Allergen Reactions    Amlodipine Itching     Pt has itching, rash, pain in feet, weakness, body shacking, and inability to walk     Antihistamines - alkylamine Shortness Of Breath    Gabapentin Shortness Of Breath    Ultram [tramadol] Shortness Of Breath, Palpitations and Other (See Comments)     Irritability    Anusol-hc [hydrocortisone] Itching    Hyoscyamine Other (See Comments)     Depressed feeling    Butalbital-acetaminophen Palpitations    Movantik [naloxegol] Nausea Only and Rash    Zocor [simvastatin] Nausea Only     Past Medical History:   Diagnosis Date    Anxiety     Arthritis     Bipolar depression     Cataract     Depression     Hypertension     Peripheral arterial disease     Prediabetes     Stroke      Past Surgical History:    Procedure Laterality Date    BACK SURGERY      x 2    SHOULDER SURGERY Left     VASCULAR SURGERY Left     Left leg     Family History   Problem Relation Age of Onset    Heart failure Father      Social History   Substance Use Topics    Smoking status: Former Smoker     Packs/day: 2.00     Years: 50.00     Types: Cigarettes     Quit date: 1/25/2010    Smokeless tobacco: Never Used    Alcohol use No     Review of Systems   Skin: Positive for itching and rash.   All other systems reviewed and are negative.      Physical Exam     Initial Vitals   BP Pulse Resp Temp SpO2   11/24/17 0943 11/24/17 0941 11/24/17 0941 11/24/17 0941 11/24/17 0941   (!) 187/68 76 19 97.8 °F (36.6 °C) 98 %      MAP       11/24/17 0943       107.67         Physical Exam    Nursing note and vitals reviewed.  Constitutional: He appears well-developed and well-nourished.   HENT:   Head: Normocephalic and atraumatic.   Eyes: EOM are normal.   Neck: Normal range of motion.   Cardiovascular: Normal rate and regular rhythm.   Pulmonary/Chest: Breath sounds normal. No respiratory distress.   Abdominal: Soft. Bowel sounds are normal.   Musculoskeletal: Normal range of motion.   Neurological: He is alert.   Skin: Skin is warm and dry. Capillary refill takes less than 2 seconds.   Erythematous maculopapular rash to the right upper arm, left upper arm and right flank area.  Nontender to palpation without induration or fluctuance   Psychiatric: He has a normal mood and affect.         ED Course   Procedures  Labs Reviewed   POCT GLUCOSE - Abnormal; Notable for the following:        Result Value    POCT Glucose 111 (*)     All other components within normal limits             Medical Decision Making:   Initial Assessment:   77-year-old male presents to the emergency department complaining of itching in the bilateral upper arms and right flank times several days.  He states this rash has occurred previously and he was given prednisone, which resolved  it.  He has seen a dermatologist who prescribed a steroid cream, but patient states the cream was only mildly effective.  Differential Diagnosis:   Urticaria  Dermatitis  Cellulitis  Abscess  ED Management:  Patient was given a prescription for a steroid burst and advised to continue current topical treatment for pruritic rash.  He was also advised to follow-up with his primary care physician or dermatologist for reevaluation within the next 3 days.                   ED Course      Clinical Impression:   The encounter diagnosis was Urticaria.    Disposition:   Disposition: Discharged  Condition: Stable                        Wilfredo Arredondo MD  11/24/17 1039

## 2018-09-17 DIAGNOSIS — I10 HYPERTENSION: ICD-10-CM

## 2018-09-17 DIAGNOSIS — Z01.818 PRE-OPERATIVE CLEARANCE: ICD-10-CM

## 2018-09-17 DIAGNOSIS — I49.5 SINUS NODE DYSFUNCTION: ICD-10-CM

## 2018-09-17 DIAGNOSIS — I25.82 CHRONIC TOTAL OCCLUSION OF CORONARY ARTERY: ICD-10-CM

## 2018-09-17 DIAGNOSIS — I70.409: Primary | ICD-10-CM

## 2018-09-17 DIAGNOSIS — I65.29: ICD-10-CM

## 2018-09-20 ENCOUNTER — HOSPITAL ENCOUNTER (OUTPATIENT)
Dept: CARDIOLOGY | Facility: HOSPITAL | Age: 78
Discharge: HOME | End: 2018-09-20
Attending: INTERNAL MEDICINE
Payer: MEDICARE

## 2018-09-20 ENCOUNTER — HOSPITAL ENCOUNTER (OUTPATIENT)
Dept: RADIOLOGY | Facility: HOSPITAL | Age: 78
Discharge: HOME | End: 2018-09-20
Attending: INTERNAL MEDICINE
Payer: MEDICARE

## 2018-09-20 DIAGNOSIS — I70.409: ICD-10-CM

## 2018-09-20 DIAGNOSIS — I49.5 SINUS NODE DYSFUNCTION: ICD-10-CM

## 2018-09-20 DIAGNOSIS — I25.82 CHRONIC TOTAL OCCLUSION OF CORONARY ARTERY: ICD-10-CM

## 2018-09-20 PROCEDURE — 78452 HT MUSCLE IMAGE SPECT MULT: CPT | Mod: 26,,, | Performed by: INTERNAL MEDICINE

## 2018-09-20 PROCEDURE — 93018 CV STRESS TEST I&R ONLY: CPT | Mod: ,,, | Performed by: INTERNAL MEDICINE

## 2018-09-20 PROCEDURE — A9502 TC99M TETROFOSMIN: HCPCS

## 2018-09-20 PROCEDURE — 93016 CV STRESS TEST SUPVJ ONLY: CPT | Mod: ,,, | Performed by: INTERNAL MEDICINE

## 2018-09-20 PROCEDURE — 63600175 PHARM REV CODE 636 W HCPCS

## 2018-09-20 PROCEDURE — 93017 CV STRESS TEST TRACING ONLY: CPT

## 2018-09-20 RX ORDER — REGADENOSON 0.08 MG/ML
INJECTION, SOLUTION INTRAVENOUS
Status: DISPENSED
Start: 2018-09-20 | End: 2018-09-20

## 2018-09-21 LAB — DIASTOLIC DYSFUNCTION: NO

## 2019-03-15 ENCOUNTER — OFFICE VISIT (OUTPATIENT)
Dept: OPHTHALMOLOGY | Facility: CLINIC | Age: 79
End: 2019-03-15
Payer: MEDICARE

## 2019-03-15 DIAGNOSIS — H52.7 REFRACTIVE ERROR: ICD-10-CM

## 2019-03-15 DIAGNOSIS — H25.13 NUCLEAR SCLEROSIS OF BOTH EYES: Primary | ICD-10-CM

## 2019-03-15 DIAGNOSIS — I10 ESSENTIAL HYPERTENSION: ICD-10-CM

## 2019-03-15 DIAGNOSIS — E11.9 DM TYPE 2 WITHOUT RETINOPATHY: ICD-10-CM

## 2019-03-15 PROCEDURE — 92136 OPHTHALMIC BIOMETRY: CPT | Mod: LT,S$GLB,, | Performed by: OPHTHALMOLOGY

## 2019-03-15 PROCEDURE — 92136 BIOMETRY: ICD-10-PCS | Mod: LT,S$GLB,, | Performed by: OPHTHALMOLOGY

## 2019-03-15 PROCEDURE — 92004 PR EYE EXAM, NEW PATIENT,COMPREHESV: ICD-10-PCS | Mod: S$GLB,,, | Performed by: OPHTHALMOLOGY

## 2019-03-15 PROCEDURE — 92004 COMPRE OPH EXAM NEW PT 1/>: CPT | Mod: S$GLB,,, | Performed by: OPHTHALMOLOGY

## 2019-03-15 PROCEDURE — 99999 PR PBB SHADOW E&M-EST. PATIENT-LVL II: ICD-10-PCS | Mod: PBBFAC,,, | Performed by: OPHTHALMOLOGY

## 2019-03-15 PROCEDURE — 99999 PR PBB SHADOW E&M-EST. PATIENT-LVL II: CPT | Mod: PBBFAC,,, | Performed by: OPHTHALMOLOGY

## 2019-03-15 RX ORDER — OFLOXACIN 3 MG/ML
1 SOLUTION/ DROPS OPHTHALMIC 4 TIMES DAILY
Qty: 5 ML | Refills: 1 | Status: SHIPPED | OUTPATIENT
Start: 2019-04-29 | End: 2019-05-09

## 2019-03-15 RX ORDER — PANTOPRAZOLE SODIUM 40 MG/1
40 TABLET, DELAYED RELEASE ORAL
COMMUNITY

## 2019-03-15 RX ORDER — NEPAFENAC 3 MG/ML
1 SUSPENSION/ DROPS OPHTHALMIC DAILY
Qty: 3 ML | Refills: 1 | Status: SHIPPED | OUTPATIENT
Start: 2019-04-29 | End: 2019-05-29

## 2019-03-15 RX ORDER — ACETAMINOPHEN AND CODEINE PHOSPHATE 300; 60 MG/1; MG/1
1 TABLET ORAL
COMMUNITY
End: 2019-03-15

## 2019-03-15 RX ORDER — ATORVASTATIN CALCIUM 40 MG/1
40 TABLET, FILM COATED ORAL DAILY
COMMUNITY
Start: 2016-10-16

## 2019-03-15 RX ORDER — DIFLUPREDNATE OPHTHALMIC 0.5 MG/ML
1 EMULSION OPHTHALMIC 4 TIMES DAILY
Qty: 5 ML | Refills: 1 | Status: SHIPPED | OUTPATIENT
Start: 2019-05-02 | End: 2019-06-01

## 2019-03-15 NOTE — PROGRESS NOTES
Subjective:       Patient ID: Raymond Garcia is a 78 y.o. male.    Chief Complaint: Cataract (Cataract Eval per Dr. Mcgee)    HPI     Cataract      Additional comments: Cataract Eval per Dr. Mcgee              Comments     78 y.o. Male is here for Cataract Eval per Dr. [facility]. Denies eye pain and f/f. Eyes feel sore per pt. No eye   allergies. No noticeable VA changes. Sun light is bothersome, do not drive   too much at night.     Eye Meds: No gtt           Last edited by FERNANDO Richmond on 3/15/2019  9:13 AM. (History)             Assessment:       1. Nuclear sclerosis of both eyes    2. 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 Essential hypertension    4. Refractive error        Plan:       Visually significant cataract OU -Pt. Wants Sx.     DM-No NPDR OU.  HTN-No retinopathy OU.  RE-Pt does not want Toric IOL's.        Cataract Surgery Consent: Patient with a visually significant cataract with difficulties of ADLs, reading, driving, night vision, glare (any and all).  Discussed with Patient/Family/Caregiver: options, risks and benefits, expectations of cataract surgery, utilized an eye model with questions and answers to facilitate discussion.  Discussed lens options and patient understands that glasses may be required for optimal vision for distance and/or near vision after cataract surgery.  The Patient/Family/Caregiver  voice good understanding and patient wishes to proceed with surgery.  The patient will likely benefit from surgery and patient signed consent for Left Eye.  CE OS 5/2/19 SN60WF 25.5,        OD 5/16/19 SN60WF 25.5.  Control DM & HTN.

## 2019-03-21 ENCOUNTER — TELEPHONE (OUTPATIENT)
Dept: OPHTHALMOLOGY | Facility: CLINIC | Age: 79
End: 2019-03-21

## 2019-03-21 DIAGNOSIS — H25.12 NUCLEAR SCLEROSIS, LEFT: Primary | ICD-10-CM

## 2019-03-26 ENCOUNTER — TELEPHONE (OUTPATIENT)
Dept: OPHTHALMOLOGY | Facility: CLINIC | Age: 79
End: 2019-03-26

## 2019-03-26 DIAGNOSIS — H25.11 NS (NUCLEAR SCLEROSIS), RIGHT: Primary | ICD-10-CM

## 2019-04-27 NOTE — H&P
[facility]  History & Physical    Subjective:      Chief Complaint/Reason for Admission:     Raymond Garcia is a 78 y.o. male.    Past Medical History:   Diagnosis Date    Anxiety     Arthritis     Bipolar depression     Cataract     Depression     Hypertension     Peripheral arterial disease     Prediabetes     Stroke      Past Surgical History:   Procedure Laterality Date    BACK SURGERY      x 2    SHOULDER SURGERY Left     VASCULAR SURGERY Left     Left leg     Family History   Problem Relation Age of Onset    Cataracts Mother     Heart failure Father     Amblyopia Neg Hx     Blindness Neg Hx     Glaucoma Neg Hx     Macular degeneration Neg Hx     Retinal detachment Neg Hx     Strabismus Neg Hx      Social History     Tobacco Use    Smoking status: Former Smoker     Packs/day: 2.00     Years: 50.00     Pack years: 100.00     Types: Cigarettes     Last attempt to quit: 2010     Years since quittin.2    Smokeless tobacco: Never Used   Substance Use Topics    Alcohol use: No     Alcohol/week: 0.0 oz    Drug use: No       No medications prior to admission.     Review of patient's allergies indicates:   Allergen Reactions    Amlodipine Itching     Pt has itching, rash, pain in feet, weakness, body shacking, and inability to walk     Antihistamines - alkylamine Shortness Of Breath    Gabapentin Shortness Of Breath    Ultram [tramadol] Shortness Of Breath, Palpitations and Other (See Comments)     Irritability    Anusol-hc [hydrocortisone] Itching    Hyoscyamine Other (See Comments)     Depressed feeling    Butalbital-acetaminophen Palpitations    Movantik [naloxegol] Nausea Only and Rash    Zocor [simvastatin] Nausea Only        Review of Systems   Eyes: Positive for blurred vision.   All other systems reviewed and are negative.      Objective:      Vital Signs (Most Recent)       Vital Signs Range (Last 24H):  BP: ()/()   Arterial Line BP: ()/()      Physical Exam   Constitutional: He is oriented to person, place, and time. He appears well-developed and well-nourished.   HENT:   Head: Normocephalic.   Eyes: Pupils are equal, round, and reactive to light. Conjunctivae and EOM are normal.   Neck: Normal range of motion. Neck supple.   Cardiovascular: Normal rate.   Pulmonary/Chest: Effort normal and breath sounds normal.   Abdominal: Soft. Bowel sounds are normal.   Musculoskeletal: Normal range of motion.   Neurological: He is alert and oriented to person, place, and time.   Skin: Skin is warm.   Psychiatric: He has a normal mood and affect.       Data Review:  ECG:     Assessment:      Cataract OS.    Plan:    CE OS.

## 2019-04-30 NOTE — PRE ADMISSION SCREENING
RN Phone Pre op.  Instructed to remain NPO after midnight prior to surgery, except to take Clonidine (patch) and Lisinopril.  Expressed understanding of instructions.  Arrival time 05:30 AM.

## 2019-05-01 ENCOUNTER — ANESTHESIA EVENT (OUTPATIENT)
Dept: SURGERY | Facility: HOSPITAL | Age: 79
End: 2019-05-01
Payer: MEDICARE

## 2019-05-02 ENCOUNTER — ANESTHESIA (OUTPATIENT)
Dept: SURGERY | Facility: HOSPITAL | Age: 79
End: 2019-05-02
Payer: MEDICARE

## 2019-05-02 ENCOUNTER — HOSPITAL ENCOUNTER (OUTPATIENT)
Facility: HOSPITAL | Age: 79
Discharge: HOME | End: 2019-05-02
Attending: OPHTHALMOLOGY | Admitting: OPHTHALMOLOGY
Payer: MEDICARE

## 2019-05-02 VITALS
SYSTOLIC BLOOD PRESSURE: 189 MMHG | DIASTOLIC BLOOD PRESSURE: 79 MMHG | OXYGEN SATURATION: 97 % | WEIGHT: 140 LBS | TEMPERATURE: 98 F | BODY MASS INDEX: 21.22 KG/M2 | RESPIRATION RATE: 18 BRPM | HEART RATE: 88 BPM | HEIGHT: 68 IN

## 2019-05-02 DIAGNOSIS — H25.10 SENILE NUCLEAR SCLEROSIS: ICD-10-CM

## 2019-05-02 LAB — POCT GLUCOSE: 106 MG/DL (ref 70–110)

## 2019-05-02 PROCEDURE — 25000003 PHARM REV CODE 250: Performed by: OPHTHALMOLOGY

## 2019-05-02 PROCEDURE — D9220A PRA ANESTHESIA: ICD-10-PCS | Mod: CRNA,,, | Performed by: NURSE ANESTHETIST, CERTIFIED REGISTERED

## 2019-05-02 PROCEDURE — V2632 POST CHMBR INTRAOCULAR LENS: HCPCS | Performed by: OPHTHALMOLOGY

## 2019-05-02 PROCEDURE — 25000003 PHARM REV CODE 250: Performed by: NURSE ANESTHETIST, CERTIFIED REGISTERED

## 2019-05-02 PROCEDURE — 25000003 PHARM REV CODE 250: Performed by: ANESTHESIOLOGY

## 2019-05-02 PROCEDURE — D9220A PRA ANESTHESIA: Mod: CRNA,,, | Performed by: NURSE ANESTHETIST, CERTIFIED REGISTERED

## 2019-05-02 PROCEDURE — 66984 PR REMOVAL, CATARACT, W/INSRT INTRAOC LENS, W/O ENDO CYCLO: ICD-10-PCS | Mod: LT,,, | Performed by: OPHTHALMOLOGY

## 2019-05-02 PROCEDURE — 63600175 PHARM REV CODE 636 W HCPCS: Performed by: OPHTHALMOLOGY

## 2019-05-02 PROCEDURE — 71000039 HC RECOVERY, EACH ADD'L HOUR: Performed by: OPHTHALMOLOGY

## 2019-05-02 PROCEDURE — 63600175 PHARM REV CODE 636 W HCPCS: Performed by: ANESTHESIOLOGY

## 2019-05-02 PROCEDURE — 37000008 HC ANESTHESIA 1ST 15 MINUTES: Performed by: OPHTHALMOLOGY

## 2019-05-02 PROCEDURE — 71000015 HC POSTOP RECOV 1ST HR: Performed by: OPHTHALMOLOGY

## 2019-05-02 PROCEDURE — 36000707: Performed by: OPHTHALMOLOGY

## 2019-05-02 PROCEDURE — D9220A PRA ANESTHESIA: ICD-10-PCS | Mod: ANES,,, | Performed by: ANESTHESIOLOGY

## 2019-05-02 PROCEDURE — 71000016 HC POSTOP RECOV ADDL HR: Performed by: OPHTHALMOLOGY

## 2019-05-02 PROCEDURE — 82962 GLUCOSE BLOOD TEST: CPT | Performed by: OPHTHALMOLOGY

## 2019-05-02 PROCEDURE — C9447 INJ, PHENYLEPHRINE KETOROLAC: HCPCS | Performed by: OPHTHALMOLOGY

## 2019-05-02 PROCEDURE — D9220A PRA ANESTHESIA: Mod: ANES,,, | Performed by: ANESTHESIOLOGY

## 2019-05-02 PROCEDURE — 63600175 PHARM REV CODE 636 W HCPCS: Performed by: NURSE ANESTHETIST, CERTIFIED REGISTERED

## 2019-05-02 PROCEDURE — 36000706: Performed by: OPHTHALMOLOGY

## 2019-05-02 PROCEDURE — 66984 XCAPSL CTRC RMVL W/O ECP: CPT | Mod: LT,,, | Performed by: OPHTHALMOLOGY

## 2019-05-02 PROCEDURE — 37000009 HC ANESTHESIA EA ADD 15 MINS: Performed by: OPHTHALMOLOGY

## 2019-05-02 PROCEDURE — 71000033 HC RECOVERY, INTIAL HOUR: Performed by: OPHTHALMOLOGY

## 2019-05-02 DEVICE — LENS 25.5: Type: IMPLANTABLE DEVICE | Site: EYE | Status: FUNCTIONAL

## 2019-05-02 RX ORDER — FENTANYL CITRATE 50 UG/ML
INJECTION, SOLUTION INTRAMUSCULAR; INTRAVENOUS
Status: DISCONTINUED | OUTPATIENT
Start: 2019-05-02 | End: 2019-05-02

## 2019-05-02 RX ORDER — PROPARACAINE HYDROCHLORIDE 5 MG/ML
1 SOLUTION/ DROPS OPHTHALMIC
Status: DISPENSED | OUTPATIENT
Start: 2019-05-02

## 2019-05-02 RX ORDER — CYCLOPENTOLATE HYDROCHLORIDE 10 MG/ML
1 SOLUTION/ DROPS OPHTHALMIC
Status: DISPENSED | OUTPATIENT
Start: 2019-05-02

## 2019-05-02 RX ORDER — SODIUM CHLORIDE, SODIUM LACTATE, POTASSIUM CHLORIDE, CALCIUM CHLORIDE 600; 310; 30; 20 MG/100ML; MG/100ML; MG/100ML; MG/100ML
INJECTION, SOLUTION INTRAVENOUS CONTINUOUS
Status: DISCONTINUED | OUTPATIENT
Start: 2019-05-02 | End: 2019-05-02 | Stop reason: HOSPADM

## 2019-05-02 RX ORDER — LABETALOL HYDROCHLORIDE 5 MG/ML
INJECTION, SOLUTION INTRAVENOUS
Status: DISCONTINUED | OUTPATIENT
Start: 2019-05-02 | End: 2019-05-02

## 2019-05-02 RX ORDER — SODIUM CHLORIDE 0.9 % (FLUSH) 0.9 %
10 SYRINGE (ML) INJECTION
Status: DISCONTINUED | OUTPATIENT
Start: 2019-05-02 | End: 2019-05-02 | Stop reason: HOSPADM

## 2019-05-02 RX ORDER — ACETAMINOPHEN 325 MG/1
650 TABLET ORAL EVERY 4 HOURS PRN
Status: DISCONTINUED | OUTPATIENT
Start: 2019-05-02 | End: 2019-05-02 | Stop reason: HOSPADM

## 2019-05-02 RX ORDER — HYDROCODONE BITARTRATE AND ACETAMINOPHEN 5; 325 MG/1; MG/1
1 TABLET ORAL EVERY 4 HOURS PRN
Status: DISCONTINUED | OUTPATIENT
Start: 2019-05-02 | End: 2019-05-02 | Stop reason: HOSPADM

## 2019-05-02 RX ORDER — SODIUM CHLORIDE 9 MG/ML
INJECTION, SOLUTION INTRAVENOUS CONTINUOUS
Status: ACTIVE | OUTPATIENT
Start: 2019-05-02

## 2019-05-02 RX ORDER — MIDAZOLAM HYDROCHLORIDE 1 MG/ML
INJECTION, SOLUTION INTRAMUSCULAR; INTRAVENOUS
Status: DISCONTINUED | OUTPATIENT
Start: 2019-05-02 | End: 2019-05-02

## 2019-05-02 RX ORDER — POVIDONE-IODINE 5 %
SOLUTION, NON-ORAL OPHTHALMIC (EYE)
Status: DISCONTINUED | OUTPATIENT
Start: 2019-05-02 | End: 2019-05-02 | Stop reason: HOSPADM

## 2019-05-02 RX ORDER — LIDOCAINE HYDROCHLORIDE 20 MG/ML
INJECTION, SOLUTION INFILTRATION; PERINEURAL
Status: DISCONTINUED | OUTPATIENT
Start: 2019-05-02 | End: 2019-05-02 | Stop reason: HOSPADM

## 2019-05-02 RX ORDER — HYDRALAZINE HYDROCHLORIDE 20 MG/ML
10 INJECTION INTRAMUSCULAR; INTRAVENOUS
Status: COMPLETED | OUTPATIENT
Start: 2019-05-02 | End: 2019-05-02

## 2019-05-02 RX ORDER — OFLOXACIN 3 MG/ML
1 SOLUTION/ DROPS OPHTHALMIC
Status: COMPLETED | OUTPATIENT
Start: 2019-05-02 | End: 2019-05-02

## 2019-05-02 RX ORDER — TROPICAMIDE 10 MG/ML
1 SOLUTION/ DROPS OPHTHALMIC
Status: DISPENSED | OUTPATIENT
Start: 2019-05-02

## 2019-05-02 RX ORDER — LIDOCAINE HYDROCHLORIDE 10 MG/ML
1 INJECTION, SOLUTION EPIDURAL; INFILTRATION; INTRACAUDAL; PERINEURAL ONCE
Status: COMPLETED | OUTPATIENT
Start: 2019-05-02 | End: 2019-05-02

## 2019-05-02 RX ORDER — PHENYLEPHRINE HYDROCHLORIDE 25 MG/ML
1 SOLUTION/ DROPS OPHTHALMIC
Status: DISPENSED | OUTPATIENT
Start: 2019-05-02

## 2019-05-02 RX ORDER — PREDNISOLONE ACETATE 10 MG/ML
SUSPENSION/ DROPS OPHTHALMIC
Status: DISCONTINUED | OUTPATIENT
Start: 2019-05-02 | End: 2019-05-02 | Stop reason: HOSPADM

## 2019-05-02 RX ADMIN — PHENYLEPHRINE HYDROCHLORIDE 1 DROP: 25 SOLUTION/ DROPS OPHTHALMIC at 06:05

## 2019-05-02 RX ADMIN — MIDAZOLAM HYDROCHLORIDE 1 MG: 1 INJECTION, SOLUTION INTRAMUSCULAR; INTRAVENOUS at 07:05

## 2019-05-02 RX ADMIN — CYCLOPENTOLATE HYDROCHLORIDE 1 DROP: 10 SOLUTION/ DROPS OPHTHALMIC at 06:05

## 2019-05-02 RX ADMIN — TROPICAMIDE 1 DROP: 10 SOLUTION/ DROPS OPHTHALMIC at 06:05

## 2019-05-02 RX ADMIN — HYDRALAZINE HYDROCHLORIDE 10 MG: 20 INJECTION INTRAMUSCULAR; INTRAVENOUS at 08:05

## 2019-05-02 RX ADMIN — LABETALOL HYDROCHLORIDE 5 MG: 5 INJECTION, SOLUTION INTRAVENOUS at 08:05

## 2019-05-02 RX ADMIN — LIDOCAINE HYDROCHLORIDE 1 MG: 10 INJECTION, SOLUTION EPIDURAL; INFILTRATION; INTRACAUDAL at 06:05

## 2019-05-02 RX ADMIN — SODIUM CHLORIDE 500 ML: 0.9 INJECTION, SOLUTION INTRAVENOUS at 06:05

## 2019-05-02 RX ADMIN — FENTANYL CITRATE 50 MCG: 50 INJECTION INTRAMUSCULAR; INTRAVENOUS at 07:05

## 2019-05-02 RX ADMIN — OFLOXACIN 1 DROP: 3 SOLUTION OPHTHALMIC at 06:05

## 2019-05-02 RX ADMIN — PROPARACAINE HYDROCHLORIDE 1 DROP: 5 SOLUTION/ DROPS OPHTHALMIC at 06:05

## 2019-05-02 RX ADMIN — HYDRALAZINE HYDROCHLORIDE 10 MG: 20 INJECTION INTRAMUSCULAR; INTRAVENOUS at 09:05

## 2019-05-02 NOTE — BRIEF OP NOTE
Operative Note     SUMMARY     Surgery Date: 5/2/2019    Surgeon(s) and Role:      Tomás Guaman MD - Primary    Pre-op Diagnosis:  Nuclear sclerosis     Post-op/ Diagnosis:  Same    Final Diagnosis: Cataract    Procedure(s) (LRB):  PHACOEMULSIFICATION-ASPIRATION-CATARACT   INSERTION-INTRAOCULAR LENS (IOL)     Anesthesia: Monitored Anesthesia Care    Findings/Key Components:  Cataract    Outcome: Tolerated procedure well    Estimated Blood Loss: None         Specimens     None          Follow-up:  Tomorrow in clinic      Discharge Note      SUMMARY     Admit Date: 5/2/2019    Attending Physician: Tomás Guaman MD    Discharge Physician: Tomás Guaman MD    Discharge Date: 5/2/2019    Final Diagnosis: Cataract    Outcome: Tolerated procedure well    Disposition: Discharge to Home.    Medications:       Raymond Garcia   Home Medication Instructions NADIA:[id number]    Printed on:05/02/19 0820   Medication Information                      acetaminophen-codeine 300-30mg (TYLENOL #3) 300-30 mg Tab  Take by mouth every 6 (six) hours as needed.             aspirin 81 MG Chew  Take 81 mg by mouth once daily.             atorvastatin (LIPITOR) 40 MG tablet  Take 40 mg by mouth.             chlorthalidone (HYGROTEN) 25 MG Tab  TAKE ONE TABLET BY MOUTH EVERY DAY             clobetasol (TEMOVATE) 0.05 % cream  APPLY TOPICALLY continuous AS NEEDED             cloNIDine 0.2 mg/24 hr td ptwk (CATAPRES) 0.2 mg/24 hr  Place 1 patch onto the skin every 7 days.             desonide (DESOWEN) 0.05 % cream  Apply topically 2 (two) times daily.             difluprednate (DUREZOL) 0.05 % Drop ophthalmic solution  Place 1 drop into the left eye 4 (four) times daily. For 30 days             fenofibrate 160 MG Tab  TAKE ONE TABLET BY MOUTH DAILY             fluocinonide-emollient 0.05 % Crea  Apply topically 2 (two) times daily.             fluticasone (FLONASE) 50 mcg/actuation nasal spray  1 spray by Each Nare  route once daily.             ILEVRO 0.3 % DrpS  Place 1 drop into both eyes once daily. For 30 days             ipratropium (ATROVENT) 0.03 % nasal spray  instill TWO SPRAYS IN each NOSTRIL TWICE DAILY             isosorbide mononitrate (IMDUR) 30 MG 24 hr tablet  TAKE ONE TABLET BY MOUTH EVERY DAY             lactulose (CHRONULAC) 10 gram/15 mL solution               lisinopril (PRINIVIL,ZESTRIL) 20 MG tablet  Take 1 tablet (20 mg total) by mouth 2 (two) times daily.             metformin (GLUCOPHAGE) 500 MG tablet  TAKE ONE TABLET BY MOUTH EVERY DAY             methocarbamol (ROBAXIN) 500 MG Tab  Take 500 mg by mouth 4 (four) times daily.             ofloxacin (OCUFLOX) 0.3 % ophthalmic solution  Place 1 drop into the left eye 4 (four) times daily. for 10 days             olanzapine (ZYPREXA) 15 MG Tab  Take 5 mg by mouth nightly.             pantoprazole (PROTONIX) 40 MG tablet  Take 40 mg by mouth.                   Patient Discharge Instructions:     Keep Hartley Shield over operated eye when not using drops.     DIET:  Regular    Activity: No heavy lifting or bending X 1 week.    Follow-up:  Tomorrow in clinic

## 2019-05-02 NOTE — TRANSFER OF CARE
"Anesthesia Transfer of Care Note    Patient: Raymond Garcia    Procedure(s) Performed: Procedure(s) (LRB):  INSERTION, IOL PROSTHESIS (Left)  PHACOEMULSIFICATION, CATARACT (Left)    Patient location: PACU    Anesthesia Type: MAC    Transport from OR: Transported from OR on room air with adequate spontaneous ventilation    Post pain: adequate analgesia    Post assessment: no apparent anesthetic complications and tolerated procedure well    Post vital signs: unstable    Level of consciousness: awake, alert and oriented    Nausea/Vomiting: no nausea/vomiting    Complications: none    Transfer of care protocol was followed      Last vitals:   Visit Vitals  BP (!) 218/87   Pulse 64   Temp 36 °C (96.8 °F)   Resp 16   Ht 5' 8" (1.727 m)   Wt 63.5 kg (140 lb)   SpO2 (!) 94%   BMI 21.29 kg/m²     "

## 2019-05-02 NOTE — ANESTHESIA POSTPROCEDURE EVALUATION
Anesthesia Post Evaluation    Patient: Raymond Garcia    Procedure(s) Performed: Procedure(s) (LRB):  INSERTION, IOL PROSTHESIS (Left)  PHACOEMULSIFICATION, CATARACT (Left)    Final Anesthesia Type: MAC  Patient location during evaluation: PACU  Patient participation: Yes- Able to Participate  Level of consciousness: awake and alert, oriented and awake  Post-procedure vital signs: reviewed and stable  Pain management: adequate  Airway patency: patent  PONV status at discharge: No PONV  Anesthetic complications: no      Cardiovascular status: blood pressure returned to baseline, hemodynamically stable and stable  Respiratory status: unassisted and spontaneous ventilation  Hydration status: euvolemic  Follow-up not needed.          Vitals Value Taken Time   /72 5/2/2019  9:42 AM   Temp 36 °C (96.8 °F) 5/2/2019  8:25 AM   Pulse 82 5/2/2019  9:45 AM   Resp 16 5/2/2019  8:25 AM   SpO2 98 % 5/2/2019  9:45 AM   Vitals shown include unvalidated device data.      No case tracking events are documented in the log.      Pain/Allen Score: Allen Score: 10 (5/2/2019  9:39 AM)

## 2019-05-02 NOTE — ANESTHESIA PREPROCEDURE EVALUATION
05/02/2019  Raymond Garcia is a 78 y.o., male.    Anesthesia Evaluation         Review of Systems  Anesthesia Hx:  No problems with previous Anesthesia   Cardiovascular:   Hypertension    Neurological:   TIA, Denies CVA. Neuromuscular Disease,     Endocrine:   Diabetes    Psych:   Psychiatric History          Physical Exam  General:  Well nourished    Airway/Jaw/Neck:  Airway Findings: Mallampati: II TM Distance: < 4 cm      Dental:  DENTAL FINDINGS: Normal   Chest/Lungs:  Chest/Lungs Clear    Heart/Vascular:  Heart Findings: Normal       Mental Status:  Mental Status Findings:  Cooperative, Alert and Oriented         Anesthesia Plan  Type of Anesthesia, risks & benefits discussed:  Anesthesia Type:  MAC  Patient's Preference:   Intra-op Monitoring Plan: standard ASA monitors  Intra-op Monitoring Plan Comments:   Post Op Pain Control Plan: multimodal analgesia, IV/PO Opioids PRN and per primary service following discharge from PACU  Post Op Pain Control Plan Comments:   Induction:    Beta Blocker:  Patient is not currently on a Beta-Blocker (No further documentation required).       Informed Consent: Patient understands risks and agrees with Anesthesia plan.  Questions answered. Anesthesia consent signed with patient.  ASA Score: 3     Day of Surgery Review of History & Physical:    H&P update referred to the provider.  H&P completed by Anesthesiologist.   Anesthesia Plan Notes: npo        Ready For Surgery From Anesthesia Perspective.

## 2019-05-02 NOTE — DISCHARGE INSTRUCTIONS
ACTIVITY LEVEL:  If you received sedation or an anesthetic, you may feel sleepy for several hours. Rest until you are more awake. Gradually resume your normal activities. Normal activity will cause no undue risk to your eye. The white part of your eye might be red - this is nothing to worry about. Wear your old glasses or sunglasses that were given to you for protection during the day.    RESTRICTIONS - for the next 7 days  · Do not lift anything over 10 pounds.  · When bending, bend at the knees not the waist.  · Do not rub the eye.  · Do not get water in the eye.  · Do not sleep on the side that had surgery.  · Protect your eye at bedtime with the shield provided.    DIET: At home, continue with liquids. If there is no nausea, you may eat a soft diet and gradually resume your normal diet. Limit alcohol intake for 24 hours.    BATHING: You may shower or bathe as normal. You may take a warm wash cloth, which has been wrung out to remove excess water, and gently remove crusting on the lashes.    MEDICATIONS: You may take Extra Strength Tylenol every 4 hours for pain/headache.     Use your drops     Today: [place]-  12noon      4pm       8pm     Beige - 12noon      4pm       8pm     Grey- 12noon    Tomorrow:  Resume pink and beige cap drops four times a day.  Resume grey cap drops once a day.    Place one drop in the eye that had surgery from the first bottle. Wait 5 minutes and then use the second bottle. (It does not matter which bottle is used first.) CONTINUE all glaucoma drops.   No driving, alcoholic beverages or signing legal documents for next 24 hours or while taking pain medication      WHEN TO CALL THE DOCTOR:  · Redness that increases significantly  · Pain not relieved by Tylenol  RETURN APPOINTMENT:  You will need to see Dr. Guaman on Friday at the [facility].  . Bring your eye kit with you on your follow-up visit. Your kit contains sunglasses, eye shield, tape and your eye drops.  FOR  EMERGENCIES:  If any unusual problems or difficulties occur, contact Dr. Guaman at the Clinic office at [phone] during normal business hours. If after hours, call [phone].  Fall Prevention  Millions of people fall every year and injure themselves. You may have had anesthesia or sedation which may increase your risk of falling. You may have health issues that put you at an increased risk of falling.     Here are ways to reduce your risk of falling.  ·   · Make your home safe by keeping walkways clear of objects you may trip over.  · Use non-slip pads under rugs. Do not use area rugs or small throw rugs.  · Use non-slip mats in bathtubs and showers.  · Install handrails and lights on staircases.  · Do not walk in poorly lit areas.  · Do not stand on chairs or wobbly ladders.  · Use caution when reaching overhead or looking upward. This position can cause a loss of balance.  · Be sure your shoes fit properly, have non-slip bottoms and are in good condition.   · Wear shoes both inside and out. Avoid going barefoot or wearing slippers.  · Be cautious when going up and down stairs, curbs, and when walking on uneven sidewalks.  · If your balance is poor, consider using a cane or walker.  · If your fall was related to alcohol use, stop or limit alcohol intake.   · If your fall was related to use of sleeping medicines, talk to your doctor about this. You may need to reduce your dosage at bedtime if you awaken during the night to go to the bathroom.    · To reduce the need for nighttime bathroom trips:  ¨ Avoid drinking fluids for several hours before going to bed  ¨ Empty your bladder before going to bed  ¨ Men can keep a urinal at the bedside  · Stay as active as you can. Balance, flexibility, strength, and endurance all come from exercise. They all play a role in preventing falls. Ask your healthcare provider which types of activity are right for you.  · Get your vision checked on a regular basis.  · If  you have pets, know where they are before you stand up or walk so you don't trip over them.  Use night lights.

## 2019-05-03 ENCOUNTER — OFFICE VISIT (OUTPATIENT)
Dept: OPHTHALMOLOGY | Facility: CLINIC | Age: 79
End: 2019-05-03
Payer: MEDICARE

## 2019-05-03 VITALS — DIASTOLIC BLOOD PRESSURE: 52 MMHG | SYSTOLIC BLOOD PRESSURE: 148 MMHG | HEART RATE: 74 BPM

## 2019-05-03 DIAGNOSIS — Z98.890 POST-OPERATIVE STATE: Primary | ICD-10-CM

## 2019-05-03 PROCEDURE — 99024 PR POST-OP FOLLOW-UP VISIT: ICD-10-PCS | Mod: S$GLB,,, | Performed by: OPHTHALMOLOGY

## 2019-05-03 PROCEDURE — 99024 POSTOP FOLLOW-UP VISIT: CPT | Mod: S$GLB,,, | Performed by: OPHTHALMOLOGY

## 2019-05-03 PROCEDURE — 99999 PR PBB SHADOW E&M-EST. PATIENT-LVL II: ICD-10-PCS | Mod: PBBFAC,,, | Performed by: OPHTHALMOLOGY

## 2019-05-03 PROCEDURE — 99999 PR PBB SHADOW E&M-EST. PATIENT-LVL II: CPT | Mod: PBBFAC,,, | Performed by: OPHTHALMOLOGY

## 2019-05-03 NOTE — PROGRESS NOTES
Subjective:       Patient ID: Raymond Garcia is a 78 y.o. male.    Chief Complaint: Post-op Evaluation (1 day po os)    HPI     Post-op Evaluation      Additional comments: 1 day po os              Comments     S/p phaco w/IOL OS- 5/2/19    1 day po os    Pt states sx went well. Pt denies pain and discomfort.     Eyemeds  Ofloxacin QID OS  Ilevro QS  Durezol QID OS           Last edited by Vaishnavi Harris on 5/3/2019  1:39 PM. (History)             Assessment:       1. Post-operative state        Plan:       S/p CE OS- Doing well.             CPM OS.  RTC 1 wk.

## 2019-05-03 NOTE — OP NOTE
DATE OF PROCEDURE:  05/02/2019.    SURGEON:  Tomás Guaman M.D.    PREOPERATIVE DIAGNOSIS:  Nuclear sclerotic cataract, left eye.    POSTOPERATIVE DIAGNOSIS:  Nuclear sclerotic cataract, left eye.    PROCEDURE:  Clear corneal phacoemulsification with posterior chamber intraocular   lens implant, left eye.    ANESTHESIA:  Monitored anesthesia care with 4% lidocaine topically.    PROCEDURE IN DETAIL:  After the appropriate preoperative consent was obtained,   the patient had the 2% Xylocaine jelly applied to the cornea.  The patient was   then brought to the operating room and placed into the supine position.  The   left eye periorbit was then prepped and draped in the usual sterile ophthalmic   fashion.  A lid speculum was then inserted into the left eye.  Several drops of   the 1% lidocaine were placed onto the left eye cornea.  The 1% lidocaine was   also applied to the perilimbal region with the Weck-prem sponge.  Using the   0.12-mm forceps and the Super Sharp blade, a paracentesis site was made at the 6   o'clock position.  Approximately 0.5 mL of the 1% lidocaine was injected into   the anterior chamber.  Next, Viscoat was injected into the anterior chamber   through the paracentesis site.  The 2.75-mm keratome and the cyclodialysis   spatula were used to create a 2.75-mm clear corneal temporal groove.  The   cystitomy needle and Utrata forceps were then used to create a continuous tear   360-degree capsulorrhexis.  BSS in a cannula was then used for hydrodissection.    Phacoemulsification then proceeded in a stop-and-chop fashion without any   complications.  Another 0.5 mL of the 1% lidocaine was injected into the   anterior chamber.  The curved tip irrigation aspiration handpiece was then used   to remove the residual cortical material from the capsular bag.  Again, the 1%   lidocaine was applied to the perilimbal region with the Weck-prem sponge.  Healon   GV was then injected into the anterior chamber  and capsular bag.  An [employer] intraocular lens model SN60WF, 25.5 diopters in power and serial   #[id number] was injected into the capsular bag with the lens injector.  The   Sinskey hook was used to position the lens into its proper place.  The residual   viscoelastic material was removed from the anterior chamber and capsular bag   with the curved tip irrigation aspiration handpiece.  Both wounds were hydrated   with BSS on a cannula.  Both wounds were checked and found to be watertight.    The lid speculum was then removed from the left eye.  The patient then had 1   drop of Vigamox ophthalmic drop and 1 drop of Econopred +1% ophthalmic drop   instilled onto the left eye cornea.  The eye was then shielded.  The patient   tolerated the procedure well and was then brought to the recovery room in good   condition.      DARIO/IN  dd: 05/02/2019 17:19:59 (CDT)  td: 05/02/2019 19:45:12 (CDT)  Doc ID   #[id number]  Job ID #[id number]    CC:

## 2019-05-09 ENCOUNTER — OFFICE VISIT (OUTPATIENT)
Dept: OPHTHALMOLOGY | Facility: CLINIC | Age: 79
End: 2019-05-09
Payer: MEDICARE

## 2019-05-09 DIAGNOSIS — H25.11 NS (NUCLEAR SCLEROSIS), RIGHT: ICD-10-CM

## 2019-05-09 DIAGNOSIS — Z98.890 POST-OPERATIVE STATE: Primary | ICD-10-CM

## 2019-05-09 PROCEDURE — 92136 PR OPHTHAL BIOMETRY,INTRAOC LENS POW CALC: ICD-10-PCS | Mod: 26,RT,S$GLB, | Performed by: OPHTHALMOLOGY

## 2019-05-09 PROCEDURE — 92136 OPHTHALMIC BIOMETRY: CPT | Mod: 26,RT,S$GLB, | Performed by: OPHTHALMOLOGY

## 2019-05-09 PROCEDURE — 99999 PR PBB SHADOW E&M-EST. PATIENT-LVL II: CPT | Mod: PBBFAC,,, | Performed by: OPHTHALMOLOGY

## 2019-05-09 PROCEDURE — 99999 PR PBB SHADOW E&M-EST. PATIENT-LVL II: ICD-10-PCS | Mod: PBBFAC,,, | Performed by: OPHTHALMOLOGY

## 2019-05-09 PROCEDURE — 99024 POSTOP FOLLOW-UP VISIT: CPT | Mod: S$GLB,,, | Performed by: OPHTHALMOLOGY

## 2019-05-09 PROCEDURE — 99024 PR POST-OP FOLLOW-UP VISIT: ICD-10-PCS | Mod: S$GLB,,, | Performed by: OPHTHALMOLOGY

## 2019-05-09 RX ORDER — AMLODIPINE BESYLATE 5 MG/1
TABLET ORAL
COMMUNITY
Start: 2019-05-08

## 2019-05-09 RX ORDER — LISINOPRIL 40 MG/1
TABLET ORAL DAILY
COMMUNITY
Start: 2019-03-28

## 2019-05-09 RX ORDER — CLONIDINE 0.3 MG/24H
1 PATCH, EXTENDED RELEASE TRANSDERMAL
COMMUNITY

## 2019-05-09 RX ORDER — OLANZAPINE 5 MG/1
5 TABLET ORAL NIGHTLY
COMMUNITY

## 2019-05-09 RX ORDER — FAMOTIDINE 10 MG/1
10 TABLET ORAL 2 TIMES DAILY
COMMUNITY

## 2019-05-09 NOTE — PRE ADMISSION SCREENING
RN Phone Pre OP.  Instructed to remain NPO after midnight prior to surgery except to take Lisinopril and Amlodipine, and wear Clonidine patch as directed.  Expressed understanding of instructions.  Arrival 05:30 AM.

## 2019-05-09 NOTE — PROGRESS NOTES
Subjective:       Patient ID: Raymond Garcia is a 78 y.o. male.    Chief Complaint: Post-op Evaluation (1 week PO OS )    HPI     Post-op Evaluation      Additional comments: 1 week PO OS               Comments     78 y.o. Male is here for 1 week PO OS. Slight eye pain left eye. Denies   f/f. Slight blurred vision left eye.     Eye Meds: Durezol BID OS                     Ilevro qd OS           Last edited by FERNANDO Richmond on 5/9/2019  8:56 AM. (History)             Assessment:       1. Post-operative state    2. NS (nuclear sclerosis), right        Plan:       S/p CE OS- Doing well.     Visually significant cataract OD -Pt. Wants Sx.        Taper gtts OS.  Cataract Surgery Consent: Patient with a visually significant cataract with difficulties of ADLs, reading, driving, night vision, glare (any and all).  Discussed with Patient/Family/Caregiver: options, risks and benefits, expectations of cataract surgery, utilized an eye model with questions and answers to facilitate discussion.  Discussed lens options and patient understands that glasses may be required for optimal vision for distance and/or near vision after cataract surgery.  The Patient/Family/Caregiver  voice good understanding and patient wishes to proceed with surgery.  The patient will likely benefit from surgery and patient signed consent for Right Eye.  CE OD 5/16/19.

## 2019-05-11 NOTE — H&P
[facility]  History & Physical    Subjective:      Chief Complaint/Reason for Admission:     Raymond Garcia is a 78 y.o. male.    Past Medical History:   Diagnosis Date    Anxiety     Arthritis     Bipolar depression     Cataract     Depression     Hypertension     Peripheral arterial disease     Prediabetes     Stroke      Past Surgical History:   Procedure Laterality Date    BACK SURGERY      x 2    CATARACT EXTRACTION W/  INTRAOCULAR LENS IMPLANT Left 2019    Dr. Guaman    EYE SURGERY      Lt cataract surgery    INSERTION, IOL PROSTHESIS Left 2019    Performed by Tomás Guaman MD at [facility] OR    PHACOEMULSIFICATION, CATARACT Left 2019    Performed by [facility] Guaman MD at [facility] OR    SHOULDER SURGERY Left     VASCULAR SURGERY Left     Left leg     Family History   Problem Relation Age of Onset    Cataracts Mother     Heart failure Father     Amblyopia Neg Hx     Blindness Neg Hx     Glaucoma Neg Hx     Macular degeneration Neg Hx     Retinal detachment Neg Hx     Strabismus Neg Hx      Social History     Tobacco Use    Smoking status: Former Smoker     Packs/day: 2.00     Years: 50.00     Pack years: 100.00     Types: Cigarettes     Last attempt to quit: 2010     Years since quittin.2    Smokeless tobacco: Never Used   Substance Use Topics    Alcohol use: No     Alcohol/week: 0.0 oz    Drug use: No       No medications prior to admission.     Review of patient's allergies indicates:   Allergen Reactions    Amlodipine Itching     Pt has itching, rash, pain in feet, weakness, body shacking, and inability to walk     Antihistamines - alkylamine Shortness Of Breath    Gabapentin Shortness Of Breath    Ultram [tramadol] Shortness Of Breath, Palpitations and Other (See Comments)     Irritability    Anusol-hc [hydrocortisone] Itching    Hyoscyamine Other (See Comments)     Depressed feeling    Butalbital-acetaminophen  Palpitations    Movantik [naloxegol] Nausea Only and Rash    Zocor [simvastatin] Nausea Only        Review of Systems   Eyes: Positive for blurred vision.   All other systems reviewed and are negative.      Objective:      Vital Signs (Most Recent)       Vital Signs Range (Last 24H):       Physical Exam    Data Review:   ECG:     Assessment:      Cataract OD.    Plan:    CE OD.

## 2019-05-15 ENCOUNTER — ANESTHESIA EVENT (OUTPATIENT)
Dept: SURGERY | Facility: HOSPITAL | Age: 79
End: 2019-05-15
Payer: MEDICARE

## 2019-05-16 ENCOUNTER — ANESTHESIA (OUTPATIENT)
Dept: SURGERY | Facility: HOSPITAL | Age: 79
End: 2019-05-16
Payer: MEDICARE

## 2019-05-16 ENCOUNTER — HOSPITAL ENCOUNTER (OUTPATIENT)
Facility: HOSPITAL | Age: 79
Discharge: HOME | End: 2019-05-16
Attending: OPHTHALMOLOGY | Admitting: OPHTHALMOLOGY
Payer: MEDICARE

## 2019-05-16 VITALS
RESPIRATION RATE: 16 BRPM | HEIGHT: 68 IN | DIASTOLIC BLOOD PRESSURE: 60 MMHG | WEIGHT: 140 LBS | TEMPERATURE: 98 F | OXYGEN SATURATION: 96 % | BODY MASS INDEX: 21.22 KG/M2 | HEART RATE: 62 BPM | SYSTOLIC BLOOD PRESSURE: 131 MMHG

## 2019-05-16 DIAGNOSIS — H25.10 SENILE NUCLEAR SCLEROSIS: ICD-10-CM

## 2019-05-16 LAB — POCT GLUCOSE: 120 MG/DL (ref 70–110)

## 2019-05-16 PROCEDURE — C9447 INJ, PHENYLEPHRINE KETOROLAC: HCPCS | Performed by: OPHTHALMOLOGY

## 2019-05-16 PROCEDURE — 25000003 PHARM REV CODE 250: Performed by: NURSE ANESTHETIST, CERTIFIED REGISTERED

## 2019-05-16 PROCEDURE — 25000003 PHARM REV CODE 250: Performed by: OPHTHALMOLOGY

## 2019-05-16 PROCEDURE — 63600175 PHARM REV CODE 636 W HCPCS: Performed by: OPHTHALMOLOGY

## 2019-05-16 PROCEDURE — 37000008 HC ANESTHESIA 1ST 15 MINUTES: Performed by: OPHTHALMOLOGY

## 2019-05-16 PROCEDURE — D9220A PRA ANESTHESIA: ICD-10-PCS | Mod: CRNA,,, | Performed by: NURSE ANESTHETIST, CERTIFIED REGISTERED

## 2019-05-16 PROCEDURE — 71000015 HC POSTOP RECOV 1ST HR: Performed by: OPHTHALMOLOGY

## 2019-05-16 PROCEDURE — 82962 GLUCOSE BLOOD TEST: CPT | Performed by: OPHTHALMOLOGY

## 2019-05-16 PROCEDURE — 37000009 HC ANESTHESIA EA ADD 15 MINS: Performed by: OPHTHALMOLOGY

## 2019-05-16 PROCEDURE — 66984 PR REMOVAL, CATARACT, W/INSRT INTRAOC LENS, W/O ENDO CYCLO: ICD-10-PCS | Mod: 79,RT,, | Performed by: OPHTHALMOLOGY

## 2019-05-16 PROCEDURE — D9220A PRA ANESTHESIA: ICD-10-PCS | Mod: ANES,,, | Performed by: ANESTHESIOLOGY

## 2019-05-16 PROCEDURE — 25000003 PHARM REV CODE 250

## 2019-05-16 PROCEDURE — D9220A PRA ANESTHESIA: Mod: CRNA,,, | Performed by: NURSE ANESTHETIST, CERTIFIED REGISTERED

## 2019-05-16 PROCEDURE — 63600175 PHARM REV CODE 636 W HCPCS: Performed by: NURSE ANESTHETIST, CERTIFIED REGISTERED

## 2019-05-16 PROCEDURE — 66984 XCAPSL CTRC RMVL W/O ECP: CPT | Mod: 79,RT,, | Performed by: OPHTHALMOLOGY

## 2019-05-16 PROCEDURE — 25000003 PHARM REV CODE 250: Performed by: ANESTHESIOLOGY

## 2019-05-16 PROCEDURE — 36000706: Performed by: OPHTHALMOLOGY

## 2019-05-16 PROCEDURE — 36000707: Performed by: OPHTHALMOLOGY

## 2019-05-16 PROCEDURE — D9220A PRA ANESTHESIA: Mod: ANES,,, | Performed by: ANESTHESIOLOGY

## 2019-05-16 PROCEDURE — V2632 POST CHMBR INTRAOCULAR LENS: HCPCS | Performed by: OPHTHALMOLOGY

## 2019-05-16 DEVICE — LENS 25.5: Type: IMPLANTABLE DEVICE | Site: EYE | Status: FUNCTIONAL

## 2019-05-16 RX ORDER — LABETALOL HYDROCHLORIDE 5 MG/ML
INJECTION, SOLUTION INTRAVENOUS
Status: DISCONTINUED | OUTPATIENT
Start: 2019-05-16 | End: 2019-05-16

## 2019-05-16 RX ORDER — FENTANYL CITRATE 50 UG/ML
INJECTION, SOLUTION INTRAMUSCULAR; INTRAVENOUS
Status: DISCONTINUED | OUTPATIENT
Start: 2019-05-16 | End: 2019-05-16

## 2019-05-16 RX ORDER — PREDNISOLONE ACETATE 10 MG/ML
SUSPENSION/ DROPS OPHTHALMIC
Status: DISCONTINUED | OUTPATIENT
Start: 2019-05-16 | End: 2019-05-16 | Stop reason: HOSPADM

## 2019-05-16 RX ORDER — PROPARACAINE HYDROCHLORIDE 5 MG/ML
1 SOLUTION/ DROPS OPHTHALMIC
Status: DISCONTINUED | OUTPATIENT
Start: 2019-05-16 | End: 2019-05-16 | Stop reason: HOSPADM

## 2019-05-16 RX ORDER — ACETAMINOPHEN 325 MG/1
650 TABLET ORAL EVERY 4 HOURS PRN
Status: CANCELLED | OUTPATIENT
Start: 2019-05-16

## 2019-05-16 RX ORDER — CYCLOPENTOLATE HYDROCHLORIDE 10 MG/ML
1 SOLUTION/ DROPS OPHTHALMIC
Status: COMPLETED | OUTPATIENT
Start: 2019-05-16 | End: 2019-05-16

## 2019-05-16 RX ORDER — POVIDONE-IODINE 5 %
SOLUTION, NON-ORAL OPHTHALMIC (EYE)
Status: DISCONTINUED | OUTPATIENT
Start: 2019-05-16 | End: 2019-05-16 | Stop reason: HOSPADM

## 2019-05-16 RX ORDER — SODIUM CHLORIDE, SODIUM LACTATE, POTASSIUM CHLORIDE, CALCIUM CHLORIDE 600; 310; 30; 20 MG/100ML; MG/100ML; MG/100ML; MG/100ML
INJECTION, SOLUTION INTRAVENOUS CONTINUOUS
Status: DISCONTINUED | OUTPATIENT
Start: 2019-05-16 | End: 2019-05-16 | Stop reason: HOSPADM

## 2019-05-16 RX ORDER — TROPICAMIDE 10 MG/ML
1 SOLUTION/ DROPS OPHTHALMIC
Status: DISCONTINUED | OUTPATIENT
Start: 2019-05-16 | End: 2019-05-16 | Stop reason: HOSPADM

## 2019-05-16 RX ORDER — OFLOXACIN 3 MG/ML
1 SOLUTION/ DROPS OPHTHALMIC
Status: COMPLETED | OUTPATIENT
Start: 2019-05-16 | End: 2019-05-16

## 2019-05-16 RX ORDER — SODIUM CHLORIDE 9 MG/ML
INJECTION, SOLUTION INTRAVENOUS CONTINUOUS
Status: DISCONTINUED | OUTPATIENT
Start: 2019-05-16 | End: 2019-05-16 | Stop reason: HOSPADM

## 2019-05-16 RX ORDER — HYDROCODONE BITARTRATE AND ACETAMINOPHEN 5; 325 MG/1; MG/1
1 TABLET ORAL EVERY 4 HOURS PRN
Status: CANCELLED | OUTPATIENT
Start: 2019-05-16

## 2019-05-16 RX ORDER — PHENYLEPHRINE HYDROCHLORIDE 25 MG/ML
1 SOLUTION/ DROPS OPHTHALMIC
Status: DISCONTINUED | OUTPATIENT
Start: 2019-05-16 | End: 2019-05-16 | Stop reason: HOSPADM

## 2019-05-16 RX ORDER — LIDOCAINE HYDROCHLORIDE 20 MG/ML
INJECTION, SOLUTION INFILTRATION; PERINEURAL
Status: DISCONTINUED | OUTPATIENT
Start: 2019-05-16 | End: 2019-05-16 | Stop reason: HOSPADM

## 2019-05-16 RX ADMIN — TROPICAMIDE 1 DROP: 10 SOLUTION/ DROPS OPHTHALMIC at 06:05

## 2019-05-16 RX ADMIN — CYCLOPENTOLATE HYDROCHLORIDE 1 DROP: 10 SOLUTION OPHTHALMIC at 06:05

## 2019-05-16 RX ADMIN — FENTANYL CITRATE 50 MCG: 50 INJECTION INTRAMUSCULAR; INTRAVENOUS at 06:05

## 2019-05-16 RX ADMIN — SODIUM CHLORIDE, SODIUM LACTATE, POTASSIUM CHLORIDE, AND CALCIUM CHLORIDE: .6; .31; .03; .02 INJECTION, SOLUTION INTRAVENOUS at 06:05

## 2019-05-16 RX ADMIN — PROPARACAINE HYDROCHLORIDE 1 DROP: 5 SOLUTION/ DROPS OPHTHALMIC at 05:05

## 2019-05-16 RX ADMIN — LABETALOL HYDROCHLORIDE 2.5 MG: 5 INJECTION, SOLUTION INTRAVENOUS at 07:05

## 2019-05-16 RX ADMIN — PHENYLEPHRINE HYDROCHLORIDE 1 DROP: 25 SOLUTION/ DROPS OPHTHALMIC at 06:05

## 2019-05-16 RX ADMIN — OFLOXACIN 1 DROP: 3 SOLUTION/ DROPS OPHTHALMIC at 06:05

## 2019-05-16 RX ADMIN — FENTANYL CITRATE 25 MCG: 50 INJECTION INTRAMUSCULAR; INTRAVENOUS at 07:05

## 2019-05-16 NOTE — BRIEF OP NOTE
Operative Note     SUMMARY     Surgery Date: 5/16/2019    Surgeon(s) and Role:      Tomás Guaman MD - Primary    Pre-op Diagnosis:  Nuclear sclerosis     Post-op/ Diagnosis:  Same    Final Diagnosis: Cataract    Procedure(s) (LRB):  PHACOEMULSIFICATION-ASPIRATION-CATARACT   INSERTION-INTRAOCULAR LENS (IOL)     Anesthesia: Monitored Anesthesia Care    Findings/Key Components:  Cataract    Outcome: Tolerated procedure well    Estimated Blood Loss: None         Specimens     None          Follow-up:  Tomorrow in clinic      Discharge Note      SUMMARY     Admit Date: 5/16/2019    Attending Physician: Tomás Guaman MD    Discharge Physician: Tomás Guaman MD    Discharge Date: 5/16/2019    Final Diagnosis: Cataract    Outcome: Tolerated procedure well    Disposition: Discharge to Home.    Medications:       Raymond Garcia   Home Medication Instructions NADIA:[id number]    Printed on:05/16/19 0748   Medication Information                      acetaminophen-codeine 300-30mg (TYLENOL #3) 300-30 mg Tab  Take by mouth every 6 (six) hours as needed.             amLODIPine (NORVASC) 5 MG tablet               aspirin 81 MG Chew  Take 81 mg by mouth once daily.             atorvastatin (LIPITOR) 40 MG tablet  Take 40 mg by mouth once daily.              chlorthalidone (HYGROTEN) 25 MG Tab  TAKE ONE TABLET BY MOUTH EVERY DAY             clobetasol (TEMOVATE) 0.05 % cream  APPLY TOPICALLY continuous AS NEEDED             cloNIDine 0.3 mg/24 hr td ptwk (CATAPRES) 0.3 mg/24 hr  Place 1 patch onto the skin.             desonide (DESOWEN) 0.05 % cream  Apply topically 2 (two) times daily.             difluprednate (DUREZOL) 0.05 % Drop ophthalmic solution  Place 1 drop into the left eye 4 (four) times daily. For 30 days             famotidine (PEPCID) 10 MG tablet  Take 10 mg by mouth 2 (two) times daily.             fenofibrate 160 MG Tab  TAKE ONE TABLET BY MOUTH DAILY             fluocinonide-emollient  0.05 % Crea  Apply topically 2 (two) times daily.             fluticasone (FLONASE) 50 mcg/actuation nasal spray  1 spray by Each Nare route once daily.             ILEVRO 0.3 % DrpS  Place 1 drop into both eyes once daily. For 30 days             ipratropium (ATROVENT) 0.03 % nasal spray  instill TWO SPRAYS IN each NOSTRIL TWICE DAILY             isosorbide mononitrate (IMDUR) 30 MG 24 hr tablet  TAKE ONE TABLET BY MOUTH EVERY DAY             lactulose (CHRONULAC) 10 gram/15 mL solution               lisinopril (PRINIVIL,ZESTRIL) 40 MG tablet               metformin (GLUCOPHAGE) 500 MG tablet  TAKE ONE TABLET BY MOUTH EVERY DAY             methocarbamol (ROBAXIN) 500 MG Tab  Take 500 mg by mouth 4 (four) times daily.             OLANZapine (ZYPREXA) 5 MG tablet  Take 5 mg by mouth.             pantoprazole (PROTONIX) 40 MG tablet  Take 40 mg by mouth.                   Patient Discharge Instructions:     Keep Hartley Shield over operated eye when not using drops.     DIET:  Regular    Activity: No heavy lifting or bending X 1 week.    Follow-up:  Tomorrow in clinic

## 2019-05-16 NOTE — TRANSFER OF CARE
"Anesthesia Transfer of Care Note    Patient: Raymond Garcia    Procedure(s) Performed: Procedure(s) (LRB):  PHACOEMULSIFICATION, CATARACT (Right)  INSERTION, IOL PROSTHESIS (Right)    Patient location: [facility]    Anesthesia Type: MAC    Transport from OR: Transported from OR on room air with adequate spontaneous ventilation    Post pain: adequate analgesia    Post assessment: no apparent anesthetic complications and tolerated procedure well    Post vital signs: stable    Level of consciousness: awake, alert and oriented    Nausea/Vomiting: no nausea/vomiting    Complications: none    Transfer of care protocol was followed      Last vitals:   Visit Vitals  BP (!) 222/92   Pulse 80   Temp 36.6 °C (97.9 °F) (Oral)   Resp 16   Ht 5' 8" (1.727 m)   Wt 63.5 kg (140 lb)   SpO2 95%   BMI 21.29 kg/m²     "

## 2019-05-16 NOTE — DISCHARGE INSTRUCTIONS
ACTIVITY LEVEL:  If you received sedation or an anesthetic, you may feel sleepy for several hours. Rest until you are more awake. Gradually resume your normal activities. Normal activity will cause no undue risk to your eye. The white part of your eye might be red - this is nothing to worry about. Wear your old glasses or sunglasses that were given to you for protection during the day.    RESTRICTIONS - for the next 7 days  · Do not lift anything over 10 pounds.  · When bending, bend at the knees not the waist.  · Do not rub the eye.  · Do not get water in the eye.  · Do not sleep on the side that had surgery.  · Protect your eye at bedtime with the shield provided.    DIET: At home, continue with liquids. If there is no nausea, you may eat a soft diet and gradually resume your normal diet. Limit alcohol intake for 24 hours.    BATHING: You may shower or bathe as normal. You may take a warm wash cloth, which has been wrung out to remove excess water, and gently remove crusting on the lashes.    MEDICATIONS: You may take Extra Strength Tylenol every 4 hours for pain/headache.     Use your drops     Today: [place]-  12noon     4pm      8pm     [place] -  12noon      4pm      8pm     [place]-  12noon    Tomorrow:  Resume pink and beige cap drops four times a day.  Resume grey cap drops once a day.    Place one drop in the eye that had surgery from the first bottle. Wait 5 minutes and then use the second bottle. (It does not matter which bottle is used first.) CONTINUE all glaucoma drops.   No driving, alcoholic beverages or signing legal documents for next 24 hours or while taking pain medication      WHEN TO CALL THE DOCTOR:  · Redness that increases significantly  · Pain not relieved by Tylenol  RETURN APPOINTMENT:  You will need to see Dr. Guaman on Friday at the [facility].     Bring your eye kit with you on your follow-up visit. Your kit contains sunglasses, eye shield, tape and your eye drops.  FOR  EMERGENCIES:  If any unusual problems or difficulties occur, contact Dr. Guaman at the Clinic office at [phone] during normal business hours. If after hours, call [phone].  Fall Prevention  Millions of people fall every year and injure themselves. You may have had anesthesia or sedation which may increase your risk of falling. You may have health issues that put you at an increased risk of falling.     Here are ways to reduce your risk of falling.  ·   · Make your home safe by keeping walkways clear of objects you may trip over.  · Use non-slip pads under rugs. Do not use area rugs or small throw rugs.  · Use non-slip mats in bathtubs and showers.  · Install handrails and lights on staircases.  · Do not walk in poorly lit areas.  · Do not stand on chairs or wobbly ladders.  · Use caution when reaching overhead or looking upward. This position can cause a loss of balance.  · Be sure your shoes fit properly, have non-slip bottoms and are in good condition.   · Wear shoes both inside and out. Avoid going barefoot or wearing slippers.  · Be cautious when going up and down stairs, curbs, and when walking on uneven sidewalks.  · If your balance is poor, consider using a cane or walker.  · If your fall was related to alcohol use, stop or limit alcohol intake.   · If your fall was related to use of sleeping medicines, talk to your doctor about this. You may need to reduce your dosage at bedtime if you awaken during the night to go to the bathroom.    · To reduce the need for nighttime bathroom trips:  ¨ Avoid drinking fluids for several hours before going to bed  ¨ Empty your bladder before going to bed  ¨ Men can keep a urinal at the bedside  · Stay as active as you can. Balance, flexibility, strength, and endurance all come from exercise. They all play a role in preventing falls. Ask your healthcare provider which types of activity are right for you.  · Get your vision checked on a regular basis.  · If  you have pets, know where they are before you stand up or walk so you don't trip over them.  Use night lights.

## 2019-05-16 NOTE — ANESTHESIA PREPROCEDURE EVALUATION
05/16/2019  Raymond Garcia is a 78 y.o., male.    Pre-op Assessment    I have reviewed the Patient Summary Reports.     I have reviewed the Nursing Notes.      Review of Systems  Anesthesia Hx:  No problems with previous Anesthesia   Denies Personal Hx of Anesthesia complications.   Social:  Former Smoker, No Alcohol Use    Cardiovascular:   Exercise tolerance: poor Hypertension PVD    Neurological:   TIA, CVA Neuromuscular Disease,    Endocrine:   Diabetes    Psych:   Psychiatric History          Physical Exam  General:  Well nourished    Airway/Jaw/Neck:  Airway Findings: Mouth Opening: Normal Tongue: Normal  Mallampati: III  TM Distance: 4 - 6 cm      Dental:  DENTAL FINDINGS: Normal   Chest/Lungs:  Chest/Lungs Clear    Heart/Vascular:  Heart Findings: Normal       Mental Status:  Mental Status Findings:  Cooperative, Alert and Oriented       Wt Readings from Last 3 Encounters:   05/09/19 63.5 kg (140 lb)   04/30/19 63.5 kg (140 lb)   11/24/17 64.4 kg (142 lb)     Temp Readings from Last 3 Encounters:   05/16/19 37.1 °C (98.7 °F) (Oral)   05/02/19 36.6 °C (97.9 °F) (Oral)   11/24/17 36.6 °C (97.8 °F) (Oral)     BP Readings from Last 3 Encounters:   05/16/19 (!) 192/78   05/03/19 (!) 148/52   05/02/19 (!) 189/79     Pulse Readings from Last 3 Encounters:   05/16/19 70   05/03/19 74   05/02/19 88         Anesthesia Plan  Type of Anesthesia, risks & benefits discussed:  Anesthesia Type:  MAC  Patient's Preference:   Intra-op Monitoring Plan: standard ASA monitors  Intra-op Monitoring Plan Comments:   Post Op Pain Control Plan: multimodal analgesia, IV/PO Opioids PRN and per primary service following discharge from PACU  Post Op Pain Control Plan Comments:   Induction:   IV  Beta Blocker:  Patient is not currently on a Beta-Blocker (No further documentation required).       Informed Consent: Patient  understands risks and agrees with Anesthesia plan.  Questions answered. Anesthesia consent signed with patient.  ASA Score: 3     Day of Surgery Review of History & Physical: I have interviewed and examined the patient. I have reviewed the patient's H&P dated:        Anesthesia Plan Notes: Pt did well with cataract surgery ~2 weeks ago under MAC.        Ready For Surgery From Anesthesia Perspective.

## 2019-05-16 NOTE — ANESTHESIA POSTPROCEDURE EVALUATION
Anesthesia Post Evaluation    Patient: Raymond Garcia    Procedure(s) Performed: Procedure(s) (LRB):  PHACOEMULSIFICATION, CATARACT (Right)  INSERTION, IOL PROSTHESIS (Right)    Final Anesthesia Type: MAC  Patient location during evaluation: [facility]  Patient participation: Yes- Able to Participate  Level of consciousness: awake and alert  Post-procedure vital signs: reviewed and stable  Pain management: adequate  Airway patency: patent  PONV status at discharge: No PONV  Anesthetic complications: no      Cardiovascular status: hemodynamically stable  Respiratory status: unassisted and spontaneous ventilation  Hydration status: euvolemic  Follow-up not needed.          Vitals Value Taken Time   /74 5/16/2019  7:59 AM   Temp 36.6 °C (97.9 °F) 5/16/2019  7:57 AM   Pulse 72 5/16/2019  7:59 AM   Resp 16 5/16/2019  7:57 AM   SpO2 96 % 5/16/2019  7:59 AM         No case tracking events are documented in the log.      Pain/Allen Score: No data recorded

## 2019-05-17 ENCOUNTER — OFFICE VISIT (OUTPATIENT)
Dept: OPHTHALMOLOGY | Facility: CLINIC | Age: 79
End: 2019-05-17
Payer: MEDICARE

## 2019-05-17 VITALS — DIASTOLIC BLOOD PRESSURE: 50 MMHG | SYSTOLIC BLOOD PRESSURE: 125 MMHG | HEART RATE: 65 BPM

## 2019-05-17 DIAGNOSIS — Z98.890 POST-OPERATIVE STATE: Primary | ICD-10-CM

## 2019-05-17 PROCEDURE — 99999 PR PBB SHADOW E&M-EST. PATIENT-LVL II: ICD-10-PCS | Mod: PBBFAC,,, | Performed by: OPHTHALMOLOGY

## 2019-05-17 PROCEDURE — 99999 PR PBB SHADOW E&M-EST. PATIENT-LVL II: CPT | Mod: PBBFAC,,, | Performed by: OPHTHALMOLOGY

## 2019-05-17 PROCEDURE — 99024 POSTOP FOLLOW-UP VISIT: CPT | Mod: S$GLB,,, | Performed by: OPHTHALMOLOGY

## 2019-05-17 PROCEDURE — 99024 PR POST-OP FOLLOW-UP VISIT: ICD-10-PCS | Mod: S$GLB,,, | Performed by: OPHTHALMOLOGY

## 2019-05-17 NOTE — OP NOTE
DATE OF PROCEDURE:  05/16/2019    SURGEON:  Tomás Guaman M.D.    PREOPERATIVE DIAGNOSIS:  Nuclear sclerotic cataract, right eye.    POSTOPERATIVE DIAGNOSIS:  Nuclear sclerotic cataract, right eye.    ANESTHESIA:  Monitored anesthesia care with 4% lidocaine topically.    COMPLICATIONS AND BLOOD LOSS:  None.    PROCEDURE:  Clear corneal phacoemulsification with posterior chamber intraocular   lens implant, right eye    PROCEDURE IN DETAIL:  After the appropriate preoperative consent was obtained,   the patient had the 2% lidocaine applied to the cornea.  The patient was then   brought to the operating room and placed into the supine position.  The right   periorbit was then prepped and draped in the usual sterile ophthalmic fashion.    A lid speculum was then inserted into the right eye.  Several drops of the 1%   lidocaine were placed onto the right cornea.  The 1% lidocaine was also applied   to the perilimbal region with the Weck-prem sponge.  Using the 0.12-mm forceps   and the Super Sharp blade, a paracentesis site was made at the 12 o'clock   position.  Approximately 0.5 mL of the 1% lidocaine was injected into the   anterior chamber.  Next, Viscoat was injected into the anterior chamber through   the paracentesis site.  The 2.75-mm keratome and the cyclodialysis spatula were   used to create a 2.75-mm clear corneal temporal groove.  The cystitomy needle   and Utrata forceps were then used to create a continuous tear 360-degree   capsulorrhexis.  BSS in a cannula was then used for hydrodissection.    Phacoemulsification then proceeded in a stop-and-chop fashion without any   complications.  Another 0.5 mL of the 1% lidocaine was injected into the   anterior chamber.  The curved tip irrigation aspiration handpiece was then used   to remove the residual cortical material from the capsular bag.  Again, the 1%   lidocaine was applied to the perilimbal region with the Weck-prem sponge.  Healon   GV was then  injected into the anterior chamber and capsular bag.  An [employer] intraocular lens model SN60WF, 25.5 diopters in power, and serial   #[id number] was injected into the capsular bag with the lens injector.  The   Sinskey hook was used to position the lens into its proper place.  The residual   viscoelastic material was removed from the anterior chamber and capsular bag   with the curved tip irrigation aspiration handpiece.  Both wounds were hydrated   with BSS on a cannula.  Both wounds were checked and found to be watertight.    The lid speculum was then removed from the right eye.  The patient then had 1   drop of Vigamox ophthalmic drop and 1 drop of Econopred +1% ophthalmic drop   instilled onto the right cornea.  The eye was then shielded.  The patient   tolerated the procedure well and was then brought to the recovery room in good   condition.      DARIO/IN  dd: 05/16/2019 16:54:15 (CDT)  td: 05/16/2019 19:53:01 (CDT)  Doc ID   #[id number]  Job ID #[id number]    CC:

## 2019-05-17 NOTE — PROGRESS NOTES
Subjective:       Patient ID: Raymond Garcia is a 78 y.o. male.    Chief Complaint: Post-op Evaluation (1 day po od)    HPI     Post-op Evaluation      Additional comments: 1 day po od              Comments     S/p Phaco w/IOL OD- 5/16/19    1 day po od    Pt states sx went well. Pt denies pain and discomfort.     Eyemeds  Ofloxacin QID OD  Ilevro QD OD  Durezol QID OD           Last edited by Vaishnavi Harris on 5/17/2019  8:44 AM. (History)             Assessment:       1. Post-operative state        Plan:       S/p CE OU- Doing well.        CPM OD.  Taper gtts OS.  RTC 1 wk.

## 2019-05-24 ENCOUNTER — OFFICE VISIT (OUTPATIENT)
Dept: OPHTHALMOLOGY | Facility: CLINIC | Age: 79
End: 2019-05-24
Payer: MEDICARE

## 2019-05-24 DIAGNOSIS — Z98.890 POST-OPERATIVE STATE: Primary | ICD-10-CM

## 2019-05-24 PROCEDURE — 99999 PR PBB SHADOW E&M-EST. PATIENT-LVL II: CPT | Mod: PBBFAC,,, | Performed by: OPHTHALMOLOGY

## 2019-05-24 PROCEDURE — 99024 PR POST-OP FOLLOW-UP VISIT: ICD-10-PCS | Mod: S$GLB,,, | Performed by: OPHTHALMOLOGY

## 2019-05-24 PROCEDURE — 99024 POSTOP FOLLOW-UP VISIT: CPT | Mod: S$GLB,,, | Performed by: OPHTHALMOLOGY

## 2019-05-24 PROCEDURE — 99999 PR PBB SHADOW E&M-EST. PATIENT-LVL II: ICD-10-PCS | Mod: PBBFAC,,, | Performed by: OPHTHALMOLOGY

## 2019-05-24 NOTE — PROGRESS NOTES
Subjective:       Patient ID: Raymond Garcia is a 78 y.o. male.    Chief Complaint: Post-op Evaluation (1 week PO OD )    HPI     Post-op Evaluation      Additional comments: 1 week PO OD               Comments     78 y.o. Male is here for 1 week PO OD. Minor eye pain right eye. Denies   f/f. No discomfort. No blurred vision.     Eye Meds: Durezol BID OD                    Ilevro qd OD           Last edited by FERNANDO Richmond on 5/24/2019 10:52 AM. (History)             Assessment:       1. Post-operative state        Plan:       S/p CE OU- Doing well.        Taper gtts OU.  RTC 3 wks.

## 2019-06-14 ENCOUNTER — OFFICE VISIT (OUTPATIENT)
Dept: OPHTHALMOLOGY | Facility: CLINIC | Age: 79
End: 2019-06-14
Payer: MEDICARE

## 2019-06-14 DIAGNOSIS — H52.7 REFRACTIVE ERROR: ICD-10-CM

## 2019-06-14 DIAGNOSIS — Z98.890 POST-OPERATIVE STATE: Primary | ICD-10-CM

## 2019-06-14 PROCEDURE — 99024 PR POST-OP FOLLOW-UP VISIT: ICD-10-PCS | Mod: S$GLB,,, | Performed by: OPHTHALMOLOGY

## 2019-06-14 PROCEDURE — 99024 POSTOP FOLLOW-UP VISIT: CPT | Mod: S$GLB,,, | Performed by: OPHTHALMOLOGY

## 2019-06-14 PROCEDURE — 99999 PR PBB SHADOW E&M-EST. PATIENT-LVL II: ICD-10-PCS | Mod: PBBFAC,,, | Performed by: OPHTHALMOLOGY

## 2019-06-14 PROCEDURE — 99999 PR PBB SHADOW E&M-EST. PATIENT-LVL II: CPT | Mod: PBBFAC,,, | Performed by: OPHTHALMOLOGY

## 2019-06-14 NOTE — PROGRESS NOTES
Subjective:       Patient ID: Raymond Garcia is a 78 y.o. male.    Chief Complaint: Post-op Evaluation    HPI      3 week PO OD.   DLS- 05/24/2019 Dr. Guaman     Pt sts doing very well happy with vision only needs otc readers PRN   +2.00 is good   (-)Flashes (-)Floaters    Finished gtts on the 12th     Last edited by Christy Lucero on 6/14/2019 10:23 AM. (History)             Assessment:       1. Post-operative state    2. Refractive error        Plan:       S/p CE OU- Doing well.  RE-Pt does not want MRx.      Readers.  RTC 6 mos.

## 2019-08-21 ENCOUNTER — TELEPHONE (OUTPATIENT)
Dept: OPTOMETRY | Facility: CLINIC | Age: 79
End: 2019-08-21

## 2019-11-15 ENCOUNTER — TELEPHONE (OUTPATIENT)
Dept: OPHTHALMOLOGY | Facility: CLINIC | Age: 79
End: 2019-11-15

## 2019-11-15 NOTE — TELEPHONE ENCOUNTER
----- Message from uJlita Haas MA sent at 11/15/2019  3:10 PM CST -----  Contact: [phone]   Pt has a recall to see the doctor in December, he would like to know if there is an opening on 12/10 around  11:00 ?    [phone]       No access to this providers schedule

## 2020-01-06 ENCOUNTER — OFFICE VISIT (OUTPATIENT)
Dept: OPHTHALMOLOGY | Facility: CLINIC | Age: 80
End: 2020-01-06
Payer: MEDICARE

## 2020-01-06 DIAGNOSIS — H43.811 VITREOUS DETACHMENT OF RIGHT EYE: Primary | ICD-10-CM

## 2020-01-06 DIAGNOSIS — H52.7 REFRACTIVE ERROR: ICD-10-CM

## 2020-01-06 DIAGNOSIS — Z96.1 PSEUDOPHAKIA: ICD-10-CM

## 2020-01-06 DIAGNOSIS — E11.9 DM TYPE 2 WITHOUT RETINOPATHY: ICD-10-CM

## 2020-01-06 PROCEDURE — 92014 PR EYE EXAM, EST PATIENT,COMPREHESV: ICD-10-PCS | Mod: S$GLB,,, | Performed by: OPHTHALMOLOGY

## 2020-01-06 PROCEDURE — 99999 PR PBB SHADOW E&M-EST. PATIENT-LVL II: ICD-10-PCS | Mod: PBBFAC,,, | Performed by: OPHTHALMOLOGY

## 2020-01-06 PROCEDURE — 99999 PR PBB SHADOW E&M-EST. PATIENT-LVL II: CPT | Mod: PBBFAC,,, | Performed by: OPHTHALMOLOGY

## 2020-01-06 PROCEDURE — 92014 COMPRE OPH EXAM EST PT 1/>: CPT | Mod: S$GLB,,, | Performed by: OPHTHALMOLOGY

## 2020-01-06 RX ORDER — HYDROCHLOROTHIAZIDE 25 MG/1
TABLET ORAL
COMMUNITY
Start: 2019-11-18

## 2020-01-07 NOTE — PROGRESS NOTES
Subjective:       Patient ID: Raymond Garcia is a 79 y.o. male.    Chief Complaint: Diabetic Eye Exam    HPI     DLS: 6/14/2019 Dr. Guaman    79 y.o. Male is here for 6 months F/U S/P CE OU & DFE for DM. Denies eye   pain and f/f. No itching, burning or tearing. No noticeable VA changes   since last visit. Wear +2.50 otc readers. Slight trouble with glare.     Eye Meds: No gtts     Last edited by FERNANDO Richmond on 1/6/2020  1:31 PM. (History)             Assessment:       1. Vitreous detachment of right eye    2. 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 Refractive error    4. Pseudophakia        Plan:       PVD OD-Stable.  DM-No NPDR OU.  RE-Pt does not want MRx.      Control DM.  RTC 1 yr.

## 2020-01-20 NOTE — PROGRESS NOTES
How Severe Is It?: mild Was notified by nurse at [facility] office who informed me that MD states to recheck patient's BP one hour after administering meds and if it does not improve, the patient will be sent to ICU on a drip. Will continue to monitor.   Is This A New Presentation, Or A Follow-Up?: Follow Up Accutane

## 2021-10-29 ENCOUNTER — OFFICE VISIT (OUTPATIENT)
Dept: OPHTHALMOLOGY | Facility: CLINIC | Age: 81
End: 2021-10-29
Payer: MEDICARE

## 2021-10-29 DIAGNOSIS — E11.9 DM TYPE 2 WITHOUT RETINOPATHY: ICD-10-CM

## 2021-10-29 DIAGNOSIS — H43.811 VITREOUS DETACHMENT OF RIGHT EYE: ICD-10-CM

## 2021-10-29 DIAGNOSIS — H04.123 DRY EYE SYNDROME OF BOTH EYES: Primary | ICD-10-CM

## 2021-10-29 DIAGNOSIS — Z96.1 PSEUDOPHAKIA: ICD-10-CM

## 2021-10-29 PROCEDURE — 1159F MED LIST DOCD IN RCRD: CPT | Mod: CPTII,S$GLB,, | Performed by: OPHTHALMOLOGY

## 2021-10-29 PROCEDURE — 2023F DILAT RTA XM W/O RTNOPTHY: CPT | Mod: CPTII,S$GLB,, | Performed by: OPHTHALMOLOGY

## 2021-10-29 PROCEDURE — 3288F FALL RISK ASSESSMENT DOCD: CPT | Mod: CPTII,S$GLB,, | Performed by: OPHTHALMOLOGY

## 2021-10-29 PROCEDURE — 2023F PR DILATED RETINAL EXAM W/O EVID OF RETINOPATHY: ICD-10-PCS | Mod: CPTII,S$GLB,, | Performed by: OPHTHALMOLOGY

## 2021-10-29 PROCEDURE — 1126F PR PAIN SEVERITY QUANTIFIED, NO PAIN PRESENT: ICD-10-PCS | Mod: CPTII,S$GLB,, | Performed by: OPHTHALMOLOGY

## 2021-10-29 PROCEDURE — 1101F PT FALLS ASSESS-DOCD LE1/YR: CPT | Mod: CPTII,S$GLB,, | Performed by: OPHTHALMOLOGY

## 2021-10-29 PROCEDURE — 92014 PR EYE EXAM, EST PATIENT,COMPREHESV: ICD-10-PCS | Mod: S$GLB,,, | Performed by: OPHTHALMOLOGY

## 2021-10-29 PROCEDURE — 3288F PR FALLS RISK ASSESSMENT DOCUMENTED: ICD-10-PCS | Mod: CPTII,S$GLB,, | Performed by: OPHTHALMOLOGY

## 2021-10-29 PROCEDURE — 99999 PR PBB SHADOW E&M-EST. PATIENT-LVL III: CPT | Mod: PBBFAC,,, | Performed by: OPHTHALMOLOGY

## 2021-10-29 PROCEDURE — 1159F PR MEDICATION LIST DOCUMENTED IN MEDICAL RECORD: ICD-10-PCS | Mod: CPTII,S$GLB,, | Performed by: OPHTHALMOLOGY

## 2021-10-29 PROCEDURE — 1101F PR PT FALLS ASSESS DOC 0-1 FALLS W/OUT INJ PAST YR: ICD-10-PCS | Mod: CPTII,S$GLB,, | Performed by: OPHTHALMOLOGY

## 2021-10-29 PROCEDURE — 1160F RVW MEDS BY RX/DR IN RCRD: CPT | Mod: CPTII,S$GLB,, | Performed by: OPHTHALMOLOGY

## 2021-10-29 PROCEDURE — 1126F AMNT PAIN NOTED NONE PRSNT: CPT | Mod: CPTII,S$GLB,, | Performed by: OPHTHALMOLOGY

## 2021-10-29 PROCEDURE — 99999 PR PBB SHADOW E&M-EST. PATIENT-LVL III: ICD-10-PCS | Mod: PBBFAC,,, | Performed by: OPHTHALMOLOGY

## 2021-10-29 PROCEDURE — 1160F PR REVIEW ALL MEDS BY PRESCRIBER/CLIN PHARMACIST DOCUMENTED: ICD-10-PCS | Mod: CPTII,S$GLB,, | Performed by: OPHTHALMOLOGY

## 2021-10-29 PROCEDURE — 92014 COMPRE OPH EXAM EST PT 1/>: CPT | Mod: S$GLB,,, | Performed by: OPHTHALMOLOGY

## 2021-10-29 RX ORDER — AMLODIPINE BESYLATE 10 MG/1
10 TABLET ORAL DAILY
COMMUNITY
Start: 2021-10-27

## 2022-11-02 ENCOUNTER — OFFICE VISIT (OUTPATIENT)
Dept: OPHTHALMOLOGY | Facility: CLINIC | Age: 82
End: 2022-11-02
Attending: OPHTHALMOLOGY
Payer: MEDICARE

## 2022-11-02 DIAGNOSIS — H43.821 VITREOMACULAR ADHESION, RIGHT: ICD-10-CM

## 2022-11-02 DIAGNOSIS — E11.9 DM TYPE 2 WITHOUT RETINOPATHY: Primary | ICD-10-CM

## 2022-11-02 PROCEDURE — 1101F PT FALLS ASSESS-DOCD LE1/YR: CPT | Mod: CPTII,S$GLB,, | Performed by: OPHTHALMOLOGY

## 2022-11-02 PROCEDURE — 2023F PR DILATED RETINAL EXAM W/O EVID OF RETINOPATHY: ICD-10-PCS | Mod: CPTII,S$GLB,, | Performed by: OPHTHALMOLOGY

## 2022-11-02 PROCEDURE — 1101F PR PT FALLS ASSESS DOC 0-1 FALLS W/OUT INJ PAST YR: ICD-10-PCS | Mod: CPTII,S$GLB,, | Performed by: OPHTHALMOLOGY

## 2022-11-02 PROCEDURE — 3288F PR FALLS RISK ASSESSMENT DOCUMENTED: ICD-10-PCS | Mod: CPTII,S$GLB,, | Performed by: OPHTHALMOLOGY

## 2022-11-02 PROCEDURE — 1160F PR REVIEW ALL MEDS BY PRESCRIBER/CLIN PHARMACIST DOCUMENTED: ICD-10-PCS | Mod: CPTII,S$GLB,, | Performed by: OPHTHALMOLOGY

## 2022-11-02 PROCEDURE — 2023F DILAT RTA XM W/O RTNOPTHY: CPT | Mod: CPTII,S$GLB,, | Performed by: OPHTHALMOLOGY

## 2022-11-02 PROCEDURE — 99999 PR PBB SHADOW E&M-EST. PATIENT-LVL III: ICD-10-PCS | Mod: PBBFAC,,, | Performed by: OPHTHALMOLOGY

## 2022-11-02 PROCEDURE — 3288F FALL RISK ASSESSMENT DOCD: CPT | Mod: CPTII,S$GLB,, | Performed by: OPHTHALMOLOGY

## 2022-11-02 PROCEDURE — 1160F RVW MEDS BY RX/DR IN RCRD: CPT | Mod: CPTII,S$GLB,, | Performed by: OPHTHALMOLOGY

## 2022-11-02 PROCEDURE — 92134 POSTERIOR SEGMENT OCT RETINA (OCULAR COHERENCE TOMOGRAPHY)-BOTH EYES: ICD-10-PCS | Mod: S$GLB,,, | Performed by: OPHTHALMOLOGY

## 2022-11-02 PROCEDURE — 1126F PR PAIN SEVERITY QUANTIFIED, NO PAIN PRESENT: ICD-10-PCS | Mod: CPTII,S$GLB,, | Performed by: OPHTHALMOLOGY

## 2022-11-02 PROCEDURE — 92134 CPTRZ OPH DX IMG PST SGM RTA: CPT | Mod: S$GLB,,, | Performed by: OPHTHALMOLOGY

## 2022-11-02 PROCEDURE — 92014 COMPRE OPH EXAM EST PT 1/>: CPT | Mod: S$GLB,,, | Performed by: OPHTHALMOLOGY

## 2022-11-02 PROCEDURE — 92014 PR EYE EXAM, EST PATIENT,COMPREHESV: ICD-10-PCS | Mod: S$GLB,,, | Performed by: OPHTHALMOLOGY

## 2022-11-02 PROCEDURE — 1126F AMNT PAIN NOTED NONE PRSNT: CPT | Mod: CPTII,S$GLB,, | Performed by: OPHTHALMOLOGY

## 2022-11-02 PROCEDURE — 1159F PR MEDICATION LIST DOCUMENTED IN MEDICAL RECORD: ICD-10-PCS | Mod: CPTII,S$GLB,, | Performed by: OPHTHALMOLOGY

## 2022-11-02 PROCEDURE — 1159F MED LIST DOCD IN RCRD: CPT | Mod: CPTII,S$GLB,, | Performed by: OPHTHALMOLOGY

## 2022-11-02 PROCEDURE — 99999 PR PBB SHADOW E&M-EST. PATIENT-LVL III: CPT | Mod: PBBFAC,,, | Performed by: OPHTHALMOLOGY

## 2022-11-02 NOTE — PROGRESS NOTES
Subjective:       Patient ID: Raymond Garcia is a 82 y.o. male      Chief Complaint   Patient presents with    Diabetic Eye Exam     Annual exam as instructed     History of Present Illness  HPI     Diabetic Eye Exam     Additional comments: Annual exam as instructed           Comments    No visual or ocular complaints.  Happy with vision OU    Had right eyelid lesion and nasal lesion on skin OS that are present for   years without change per pt    1. NSC OU  S/p phaco w/IOL OS- 5/2/19 --by Dr. CATRACHO Guaman MD  S/p Phaco w/IOL OD- 5/16/19 by Dr. CATRACHO Guaman MD    2. NILTON OU   3. Vitreous Det. OD          Last edited by Ramo Barker MD on 11/2/2022 11:14 AM.        Imaging:    See report    Assessment/Plan:     1. DM type 2 without retinopathy  Stressed importance of good BS/BP/Chol Control  RTC immediately PRN any vision changes, gee blurry vision, missing vision, floaters, distortions, etc      2. Vitreomacular adhesion, right  Excellent Va and no traction  Observe  - OCT- Retina    Lid lesion OD, nasal cyst near OS  Observe.  Longstanding per pt and noted on prior exams  Pt to call or return sooner if changing      Follow up in about 1 year (around 11/2/2023), or if symptoms worsen or fail to improve, for Reestablish care with Dr Barbosa.

## 2023-09-06 DIAGNOSIS — I65.23 ASYMPTOMATIC BILATERAL CAROTID ARTERY STENOSIS: Primary | ICD-10-CM

## 2023-09-07 DIAGNOSIS — R63.4 WEIGHT LOSS, NON-INTENTIONAL: Primary | ICD-10-CM

## 2023-09-07 DIAGNOSIS — R93.89 ABNORMAL CXR (CHEST X-RAY): Primary | ICD-10-CM

## 2023-09-13 ENCOUNTER — HOSPITAL ENCOUNTER (OUTPATIENT)
Dept: RADIOLOGY | Facility: HOSPITAL | Age: 83
Discharge: HOME | End: 2023-09-13
Attending: INTERNAL MEDICINE
Payer: MEDICARE

## 2023-09-13 DIAGNOSIS — R63.4 WEIGHT LOSS, NON-INTENTIONAL: ICD-10-CM

## 2023-09-13 DIAGNOSIS — I65.23 ASYMPTOMATIC BILATERAL CAROTID ARTERY STENOSIS: ICD-10-CM

## 2023-09-13 PROCEDURE — 93880 US CAROTID BILATERAL: ICD-10-PCS | Mod: 26,,, | Performed by: RADIOLOGY

## 2023-09-13 PROCEDURE — 93880 EXTRACRANIAL BILAT STUDY: CPT | Mod: TC

## 2023-09-13 PROCEDURE — 71046 X-RAY EXAM CHEST 2 VIEWS: CPT | Mod: TC,FY

## 2023-09-13 PROCEDURE — 71046 XR CHEST PA AND LATERAL: ICD-10-PCS | Mod: 26,,, | Performed by: RADIOLOGY

## 2023-09-13 PROCEDURE — 71046 X-RAY EXAM CHEST 2 VIEWS: CPT | Mod: 26,,, | Performed by: RADIOLOGY

## 2023-09-13 PROCEDURE — 93880 EXTRACRANIAL BILAT STUDY: CPT | Mod: 26,,, | Performed by: RADIOLOGY

## 2023-09-18 ENCOUNTER — HOSPITAL ENCOUNTER (OUTPATIENT)
Dept: RADIOLOGY | Facility: HOSPITAL | Age: 83
Discharge: HOME | End: 2023-09-18
Attending: INTERNAL MEDICINE
Payer: MEDICARE

## 2023-09-18 DIAGNOSIS — R93.89 ABNORMAL CXR (CHEST X-RAY): ICD-10-CM

## 2023-09-18 PROCEDURE — 71250 CT THORAX DX C-: CPT | Mod: TC

## (undated) DEVICE — SYR 3CC LUER LOC

## (undated) DEVICE — SOL IRR STRL WATER 500ML

## (undated) DEVICE — KIT CUSTOM BASIC EYE ST / MEA

## (undated) DEVICE — CARTRIDGE LENS D

## (undated) DEVICE — KNIFE ANGLE 1MM

## (undated) DEVICE — SEE MEDLINE ITEM 157117

## (undated) DEVICE — NDL 18GA X1 1/2 REG BEVEL

## (undated) DEVICE — HANDPIECE TRANSFORMER I/A

## (undated) DEVICE — SHEILD & GARTERS FOX METAL EYE

## (undated) DEVICE — GLOVE BIOGEL ECLIPSE SZ 7.5

## (undated) DEVICE — KIT EYE PIC PACK WB

## (undated) DEVICE — SYR 10CC LUER LOCK